# Patient Record
Sex: FEMALE | Race: WHITE | NOT HISPANIC OR LATINO | URBAN - METROPOLITAN AREA
[De-identification: names, ages, dates, MRNs, and addresses within clinical notes are randomized per-mention and may not be internally consistent; named-entity substitution may affect disease eponyms.]

---

## 2017-01-13 ENCOUNTER — OUTPATIENT (OUTPATIENT)
Dept: OUTPATIENT SERVICES | Facility: HOSPITAL | Age: 77
LOS: 1 days | Discharge: HOME | End: 2017-01-13

## 2017-03-29 ENCOUNTER — RX RENEWAL (OUTPATIENT)
Age: 77
End: 2017-03-29

## 2017-04-05 ENCOUNTER — OUTPATIENT (OUTPATIENT)
Dept: OUTPATIENT SERVICES | Facility: HOSPITAL | Age: 77
LOS: 1 days | Discharge: HOME | End: 2017-04-05

## 2017-04-06 ENCOUNTER — RX RENEWAL (OUTPATIENT)
Age: 77
End: 2017-04-06

## 2017-04-12 ENCOUNTER — RX RENEWAL (OUTPATIENT)
Age: 77
End: 2017-04-12

## 2017-04-13 ENCOUNTER — RX RENEWAL (OUTPATIENT)
Age: 77
End: 2017-04-13

## 2017-05-23 ENCOUNTER — OUTPATIENT (OUTPATIENT)
Dept: OUTPATIENT SERVICES | Facility: HOSPITAL | Age: 77
LOS: 1 days | Discharge: HOME | End: 2017-05-23

## 2017-05-23 ENCOUNTER — APPOINTMENT (OUTPATIENT)
Dept: HEMATOLOGY ONCOLOGY | Facility: CLINIC | Age: 77
End: 2017-05-23

## 2017-05-23 VITALS
WEIGHT: 166 LBS | HEART RATE: 80 BPM | HEIGHT: 61 IN | DIASTOLIC BLOOD PRESSURE: 63 MMHG | SYSTOLIC BLOOD PRESSURE: 140 MMHG | RESPIRATION RATE: 14 BRPM | BODY MASS INDEX: 31.34 KG/M2 | TEMPERATURE: 97.6 F

## 2017-05-23 DIAGNOSIS — N39.0 URINARY TRACT INFECTION, SITE NOT SPECIFIED: ICD-10-CM

## 2017-05-25 LAB
25(OH)D3 SERPL-MCNC: 77 NG/ML
ALBUMIN SERPL-MCNC: 4 G/DL
ALBUMIN/GLOB SERPL: 2
ALP SERPL-CCNC: 50 IU/L
ALT SERPL-CCNC: 19 IU/L
ANION GAP SERPL CALC-SCNC: 13 MEQ/L
AST SERPL-CCNC: 31 IU/L
BASOPHILS # BLD: 0.02 TH/MM3
BASOPHILS NFR BLD: 0.4 %
BILIRUB SERPL-MCNC: 0.8 MG/DL
BUN SERPL-MCNC: 15 MG/DL
BUN/CREAT SERPL: 10 %
CALCIUM SERPL-MCNC: 9.1 MG/DL
CHLORIDE SERPL-SCNC: 103 MEQ/L
CHOLEST SERPL-MCNC: 182 MG/DL
CO2 SERPL-SCNC: 24 MEQ/L
CREAT SERPL-MCNC: 1.5 MG/DL
EOSINOPHIL # BLD: 0.18 TH/MM3
EOSINOPHIL NFR BLD: 3.5 %
ERYTHROCYTE [DISTWIDTH] IN BLOOD BY AUTOMATED COUNT: 12.9 %
GFR SERPL CREATININE-BSD FRML MDRD: 34
GLUCOSE SERPL-MCNC: 101 MG/DL
GRANULOCYTES # BLD: 3.21 TH/MM3
GRANULOCYTES NFR BLD: 62.4 %
HCT VFR BLD AUTO: 29.7 %
HGB BLD-MCNC: 10.3 G/DL
IMM GRANULOCYTES # BLD: 0 TH/MM3
IMM GRANULOCYTES NFR BLD: 0 %
LYMPHOCYTES # BLD: 1.49 TH/MM3
LYMPHOCYTES NFR BLD: 29 %
MCH RBC QN AUTO: 35.5 PG
MCHC RBC AUTO-ENTMCNC: 34.7 G/DL
MCV RBC AUTO: 102.4 FL
MONOCYTES # BLD: 0.24 TH/MM3
MONOCYTES NFR BLD: 4.7 %
PLATELET # BLD: 173 TH/MM3
PMV BLD AUTO: 10.1 FL
POTASSIUM SERPL-SCNC: 4.5 MMOL/L
PROT SERPL-MCNC: 6 G/DL
RBC # BLD AUTO: 2.9 MIL/MM3
SODIUM SERPL-SCNC: 140 MEQ/L
VITAMIN D2 SERPL-MCNC: 18 NG/ML
VITAMIN D3 SERPL-MCNC: 59 NG/ML
WBC # BLD: 5.14 TH/MM3

## 2017-05-30 ENCOUNTER — APPOINTMENT (OUTPATIENT)
Dept: HEMATOLOGY ONCOLOGY | Facility: CLINIC | Age: 77
End: 2017-05-30

## 2017-05-30 LAB — BCR/ABL BY PCR (NORTH): NORMAL

## 2017-06-05 ENCOUNTER — INPATIENT (INPATIENT)
Facility: HOSPITAL | Age: 77
LOS: 4 days | Discharge: HOME HEALTH | End: 2017-06-10
Attending: HOSPITALIST

## 2017-06-05 DIAGNOSIS — N39.0 URINARY TRACT INFECTION, SITE NOT SPECIFIED: ICD-10-CM

## 2017-06-28 DIAGNOSIS — B96.20 UNSPECIFIED ESCHERICHIA COLI [E. COLI] AS THE CAUSE OF DISEASES CLASSIFIED ELSEWHERE: ICD-10-CM

## 2017-06-28 DIAGNOSIS — Z90.5 ACQUIRED ABSENCE OF KIDNEY: ICD-10-CM

## 2017-06-28 DIAGNOSIS — E86.0 DEHYDRATION: ICD-10-CM

## 2017-06-28 DIAGNOSIS — N30.01 ACUTE CYSTITIS WITH HEMATURIA: ICD-10-CM

## 2017-06-28 DIAGNOSIS — F03.90 UNSPECIFIED DEMENTIA, UNSPECIFIED SEVERITY, WITHOUT BEHAVIORAL DISTURBANCE, PSYCHOTIC DISTURBANCE, MOOD DISTURBANCE, AND ANXIETY: ICD-10-CM

## 2017-06-28 DIAGNOSIS — R13.19 OTHER DYSPHAGIA: ICD-10-CM

## 2017-06-28 DIAGNOSIS — N39.0 URINARY TRACT INFECTION, SITE NOT SPECIFIED: ICD-10-CM

## 2017-06-28 DIAGNOSIS — N17.0 ACUTE KIDNEY FAILURE WITH TUBULAR NECROSIS: ICD-10-CM

## 2017-06-28 DIAGNOSIS — D18.09 HEMANGIOMA OF OTHER SITES: ICD-10-CM

## 2017-06-28 DIAGNOSIS — E83.42 HYPOMAGNESEMIA: ICD-10-CM

## 2017-06-28 DIAGNOSIS — M47.9 SPONDYLOSIS, UNSPECIFIED: ICD-10-CM

## 2017-06-28 DIAGNOSIS — D69.6 THROMBOCYTOPENIA, UNSPECIFIED: ICD-10-CM

## 2017-06-28 DIAGNOSIS — D50.9 IRON DEFICIENCY ANEMIA, UNSPECIFIED: ICD-10-CM

## 2017-06-28 DIAGNOSIS — N18.3 CHRONIC KIDNEY DISEASE, STAGE 3 (MODERATE): ICD-10-CM

## 2017-06-28 DIAGNOSIS — C92.10 CHRONIC MYELOID LEUKEMIA, BCR/ABL-POSITIVE, NOT HAVING ACHIEVED REMISSION: ICD-10-CM

## 2017-06-28 DIAGNOSIS — K80.20 CALCULUS OF GALLBLADDER WITHOUT CHOLECYSTITIS WITHOUT OBSTRUCTION: ICD-10-CM

## 2017-07-10 ENCOUNTER — OUTPATIENT (OUTPATIENT)
Dept: OUTPATIENT SERVICES | Facility: HOSPITAL | Age: 77
LOS: 1 days | Discharge: HOME | End: 2017-07-10

## 2017-07-10 DIAGNOSIS — F41.1 GENERALIZED ANXIETY DISORDER: ICD-10-CM

## 2017-07-10 DIAGNOSIS — N39.0 URINARY TRACT INFECTION, SITE NOT SPECIFIED: ICD-10-CM

## 2017-09-27 ENCOUNTER — OUTPATIENT (OUTPATIENT)
Dept: OUTPATIENT SERVICES | Facility: HOSPITAL | Age: 77
LOS: 1 days | Discharge: HOME | End: 2017-09-27

## 2017-09-27 DIAGNOSIS — F41.1 GENERALIZED ANXIETY DISORDER: ICD-10-CM

## 2017-11-20 ENCOUNTER — EMERGENCY (EMERGENCY)
Facility: HOSPITAL | Age: 77
LOS: 0 days | Discharge: HOME | End: 2017-11-21
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
## 2017-11-20 DIAGNOSIS — S09.90XA UNSPECIFIED INJURY OF HEAD, INITIAL ENCOUNTER: ICD-10-CM

## 2017-11-20 DIAGNOSIS — N39.0 URINARY TRACT INFECTION, SITE NOT SPECIFIED: ICD-10-CM
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
## 2017-11-20 DIAGNOSIS — Z23 ENCOUNTER FOR IMMUNIZATION: ICD-10-CM

## 2017-11-20 DIAGNOSIS — F03.90 UNSPECIFIED DEMENTIA WITHOUT BEHAVIORAL DISTURBANCE: ICD-10-CM
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
## 2017-11-20 DIAGNOSIS — E78.5 HYPERLIPIDEMIA, UNSPECIFIED: ICD-10-CM

## 2017-12-04 ENCOUNTER — APPOINTMENT (OUTPATIENT)
Dept: HEMATOLOGY ONCOLOGY | Facility: CLINIC | Age: 77
End: 2017-12-04

## 2017-12-04 ENCOUNTER — OUTPATIENT (OUTPATIENT)
Dept: OUTPATIENT SERVICES | Facility: HOSPITAL | Age: 77
LOS: 1 days | Discharge: HOME | End: 2017-12-04

## 2017-12-04 ENCOUNTER — RESULT REVIEW (OUTPATIENT)
Age: 77
End: 2017-12-04

## 2017-12-07 DIAGNOSIS — C92.11 CHRONIC MYELOID LEUKEMIA, BCR/ABL-POSITIVE, IN REMISSION: ICD-10-CM

## 2017-12-07 LAB
ALBUMIN SERPL-MCNC: 4 G/DL
ALBUMIN/GLOB SERPL: 1.9
ALP SERPL-CCNC: 65 IU/L
ALT SERPL-CCNC: 21 IU/L
ANION GAP SERPL CALC-SCNC: 10 MEQ/L
AST SERPL-CCNC: 36 IU/L
BASOPHILS # BLD: 0.01 TH/MM3
BASOPHILS NFR BLD: 0.2 %
BILIRUB SERPL-MCNC: 0.7 MG/DL
BUN SERPL-MCNC: 12 MG/DL
BUN/CREAT SERPL: 9 %
CALCIUM SERPL-MCNC: 9.3 MG/DL
CHLORIDE SERPL-SCNC: 103 MEQ/L
CO2 SERPL-SCNC: 23 MEQ/L
CREAT SERPL-MCNC: 1.33 MG/DL
EOSINOPHIL # BLD: 0.21 TH/MM3
EOSINOPHIL NFR BLD: 4.7 %
ERYTHROCYTE [DISTWIDTH] IN BLOOD BY AUTOMATED COUNT: 13.3 %
GFR SERPL CREATININE-BSD FRML MDRD: 39
GLUCOSE SERPL-MCNC: 89 MG/DL
GRANULOCYTES # BLD: 2.43 TH/MM3
GRANULOCYTES NFR BLD: 54.2 %
HCT VFR BLD AUTO: 27.4 %
HGB BLD-MCNC: 9 G/DL
IMM GRANULOCYTES # BLD: 0.01 TH/MM3
IMM GRANULOCYTES NFR BLD: 0.2 %
LYMPHOCYTES # BLD: 1.58 TH/MM3
LYMPHOCYTES NFR BLD: 35.3 %
MCH RBC QN AUTO: 34.6 PG
MCHC RBC AUTO-ENTMCNC: 32.8 G/DL
MCV RBC AUTO: 105.4 FL
MONOCYTES # BLD: 0.24 TH/MM3
MONOCYTES NFR BLD: 5.4 %
PERCENT SATURATION (NORTH): 31.4 %
PLATELET # BLD: 245 TH/MM3
PMV BLD AUTO: 9 FL
POTASSIUM SERPL-SCNC: 4.6 MMOL/L
PROT SERPL-MCNC: 6.1 G/DL
RBC # BLD AUTO: 2.6 MIL/MM3
SODIUM SERPL-SCNC: 136 MEQ/L
TIBC SERPL-MCNC: 255 UG/DL
TRANSFERRIN SERPL-MCNC: 182 MG/DL
WBC # BLD: 4.48 TH/MM3

## 2017-12-12 LAB
BCR/ABL BY PCR (NORTH): NORMAL
FERRITIN SERPL-MCNC: 464 NG/ML
FOLATE SERPL-MCNC: > 20 NG/ML
VIT B12 SERPL-MCNC: 927 PG/ML

## 2018-01-12 ENCOUNTER — OUTPATIENT (OUTPATIENT)
Dept: OUTPATIENT SERVICES | Facility: HOSPITAL | Age: 78
LOS: 1 days | Discharge: HOME | End: 2018-01-12

## 2018-01-12 DIAGNOSIS — F41.1 GENERALIZED ANXIETY DISORDER: ICD-10-CM

## 2018-03-27 ENCOUNTER — OUTPATIENT (OUTPATIENT)
Dept: OUTPATIENT SERVICES | Facility: HOSPITAL | Age: 78
LOS: 1 days | Discharge: HOME | End: 2018-03-27

## 2018-03-27 DIAGNOSIS — M25.552 PAIN IN LEFT HIP: ICD-10-CM

## 2018-05-02 ENCOUNTER — OUTPATIENT (OUTPATIENT)
Dept: OUTPATIENT SERVICES | Facility: HOSPITAL | Age: 78
LOS: 1 days | Discharge: HOME | End: 2018-05-02

## 2018-05-02 DIAGNOSIS — F33.0 MAJOR DEPRESSIVE DISORDER, RECURRENT, MILD: ICD-10-CM

## 2018-05-02 DIAGNOSIS — F41.1 GENERALIZED ANXIETY DISORDER: ICD-10-CM

## 2018-06-12 ENCOUNTER — APPOINTMENT (OUTPATIENT)
Dept: HEMATOLOGY ONCOLOGY | Facility: CLINIC | Age: 78
End: 2018-06-12

## 2018-06-12 ENCOUNTER — LABORATORY RESULT (OUTPATIENT)
Age: 78
End: 2018-06-12

## 2018-06-12 ENCOUNTER — OUTPATIENT (OUTPATIENT)
Dept: OUTPATIENT SERVICES | Facility: HOSPITAL | Age: 78
LOS: 1 days | Discharge: HOME | End: 2018-06-12
Payer: SELF-PAY

## 2018-06-12 VITALS
WEIGHT: 166 LBS | TEMPERATURE: 98.6 F | RESPIRATION RATE: 14 BRPM | HEART RATE: 75 BPM | HEIGHT: 61 IN | BODY MASS INDEX: 31.34 KG/M2

## 2018-06-12 DIAGNOSIS — C92.10 CHRONIC MYELOID LEUKEMIA, BCR/ABL-POSITIVE, NOT HAVING ACHIEVED REMISSION: ICD-10-CM

## 2018-06-12 RX ORDER — IMATINIB MESYLATE 400 MG/1
400 TABLET ORAL DAILY
Qty: 90 | Refills: 2 | Status: ACTIVE | COMMUNITY
Start: 2017-04-12 | End: 1900-01-01

## 2018-06-12 NOTE — REASON FOR VISIT
[Follow-Up Visit] : a follow-up visit for [Myeloproliferative Disorder] : myeloproliferative disorder

## 2018-06-14 LAB
APPEARANCE: CLEAR
BILIRUBIN URINE: NEGATIVE
BLOOD URINE: NEGATIVE
COLOR: YELLOW
GLUCOSE QUALITATIVE U: NEGATIVE MG/DL
KETONES URINE: NEGATIVE
LEUKOCYTE ESTERASE URINE: ABNORMAL
NITRITE URINE: NEGATIVE
PH URINE: 6.5
PROTEIN URINE: NEGATIVE MG/DL
SPECIFIC GRAVITY URINE: 1.01
UROBILINOGEN URINE: 0.2 MG/DL (ref 0.2–?)

## 2018-06-14 NOTE — ASSESSMENT
[FreeTextEntry1] : This is a 76-year-old female with end-stage dementia as well as CML chronic phase since 2010 has been on Gleevec 400 mg daily with most BCR ABL PCR demonstrated molecular response. She also has mild anemia possibly related to Gleevec.\par It was understood that patient dementia is getting worse and getting her to doctors appointments has been difficult\par we talked about stopping gleevac if BCR ABL PCR is undetectable and observing her. \par \par Plan:\par -Will repeat  BCR ABL PCR and will call daughter with results. If undetectable will may consider to stop gleevac and observe her with blood work granted it would need to be monthly for not but given her dementia maybe every 3 months would be sufficient .\par -Her HB is stable at 9.9\par -rtc 6 months if all stable

## 2018-06-14 NOTE — HISTORY OF PRESENT ILLNESS
[de-identified] :  The patient with history of CML, currently on Gleevec.\par \par HISTORY OF PRESENT ILLNESS:  Ms. Mckenzie is a pleasant 76-year-old female patient with a history of CML diagnosed in 2010, currently on Gleevec at 400 mg daily.  Her last evaluation was in 11/2015.  She had missed her followup appointment in 02/2016.  The patient is currently on Gleevec at 400 mg a day.  [facility] has advanced [facility]  Currently has a helper at home and lives with her daughter.  The last  CBC  white count of 5.34 with a hemoglobin of 10.2 and a platelet count of 175.  Her CBC is stable.  Her hemoglobin in 11/2015 was at 10.8.  Her BCRABL testing in 11/2015 showed no detection of the BCRABL.  She also was noted to have a normal creatinine in 11/2015 of 1.05, a GFR of 51.  No evidence of transaminitis. [de-identified] : She presents for followup appointment  her BCR/ABL PCR on On June 20, 2016 was not detectable  the rest of blood work was as above. Here with her daughter Allison. She is alert and oriented x0. Her daughter states us initially she does remember. She has 24-hour care at home. She is tolerating Gleevec without any consultations that her daughter can comment on. The patient is not complaining of anything but it's difficult to communicate with her because of the underlying dementia.\par \par 5/23/17\par She errturns for follow up with her daughter. Dementia is about the same. No complaints\par \par 12/4/17\par Patient returns for follow up, her dementia is at baseline, she complains of a headache, otherwise feels well. The family is concerned about anemia as the patient has become pale and less energetic. The patient denies any shortness of breath, chest pain, nausea/vomiting or abdominal pain. As per her daughter no blood in her stool or dark stool. \par \par 06/2018\par Patient is here with her daughter for a follow up visit. Her dementia is worse and as per daughter, she has trouble getting her out of the house. She hadblood work done at [facility] and it showed HB 9.9 ( Improved from 9.0) MCV of  105. She continues on Gleevac and has been tolerating OK. \par Her LFTs were Normal.  \par

## 2018-06-14 NOTE — PHYSICAL EXAM
[Restricted in physically strenuous activity but ambulatory and able to carry out work of a light or sedentary nature] : Status 1- Restricted in physically strenuous activity but ambulatory and able to carry out work of a light or sedentary nature, e.g., light house work, office work [Normal] : grossly intact [de-identified] : completely disoriented

## 2018-06-14 NOTE — REVIEW OF SYSTEMS
[Negative] : Heme/Lymph [Fever] : no fever [Recent Change In Weight] : ~T no recent weight change [FreeTextEntry2] : once again difficult to obtain from patient and were obtained from the daughter

## 2018-06-15 LAB
BACTERIA UR CULT: ABNORMAL
BCR/ABL BY RT - PCR QUANTITATIVE: SIGNIFICANT CHANGE UP

## 2018-06-18 DIAGNOSIS — C92.11 CHRONIC MYELOID LEUKEMIA, BCR/ABL-POSITIVE, IN REMISSION: ICD-10-CM

## 2018-06-21 ENCOUNTER — INPATIENT (INPATIENT)
Facility: HOSPITAL | Age: 78
LOS: 10 days | End: 2018-07-02
Attending: SURGERY | Admitting: SURGERY
Payer: MEDICARE

## 2018-06-21 VITALS
DIASTOLIC BLOOD PRESSURE: 114 MMHG | HEART RATE: 89 BPM | SYSTOLIC BLOOD PRESSURE: 172 MMHG | OXYGEN SATURATION: 98 % | RESPIRATION RATE: 18 BRPM

## 2018-06-21 DIAGNOSIS — S02.602A FRACTURE OF UNSPECIFIED PART OF BODY OF LEFT MANDIBLE, INITIAL ENCOUNTER FOR CLOSED FRACTURE: ICD-10-CM

## 2018-06-21 LAB
ALBUMIN SERPL ELPH-MCNC: 4 G/DL — SIGNIFICANT CHANGE UP (ref 3.5–5.2)
ALP SERPL-CCNC: 67 U/L — SIGNIFICANT CHANGE UP (ref 30–115)
ALT FLD-CCNC: 32 U/L — SIGNIFICANT CHANGE UP (ref 0–41)
ANION GAP SERPL CALC-SCNC: 12 MMOL/L — SIGNIFICANT CHANGE UP (ref 7–14)
ANION GAP SERPL CALC-SCNC: 17 MMOL/L — HIGH (ref 7–14)
APPEARANCE UR: (no result)
APTT BLD: 28.1 SEC — SIGNIFICANT CHANGE UP (ref 27–39.2)
AST SERPL-CCNC: 57 U/L — HIGH (ref 0–41)
BASOPHILS # BLD AUTO: 0.02 K/UL — SIGNIFICANT CHANGE UP (ref 0–0.2)
BASOPHILS NFR BLD AUTO: 0.2 % — SIGNIFICANT CHANGE UP (ref 0–1)
BILIRUB SERPL-MCNC: 0.5 MG/DL — SIGNIFICANT CHANGE UP (ref 0.2–1.2)
BILIRUB UR-MCNC: NEGATIVE — SIGNIFICANT CHANGE UP
BLD GP AB SCN SERPL QL: SIGNIFICANT CHANGE UP
BUN SERPL-MCNC: 22 MG/DL — HIGH (ref 10–20)
BUN SERPL-MCNC: 24 MG/DL — HIGH (ref 10–20)
CA-I SERPL-SCNC: 1.2 MMOL/L — SIGNIFICANT CHANGE UP (ref 1.12–1.3)
CALCIUM SERPL-MCNC: 8.5 MG/DL — SIGNIFICANT CHANGE UP (ref 8.5–10.1)
CALCIUM SERPL-MCNC: 8.8 MG/DL — SIGNIFICANT CHANGE UP (ref 8.5–10.1)
CHLORIDE SERPL-SCNC: 103 MMOL/L — SIGNIFICANT CHANGE UP (ref 98–110)
CHLORIDE SERPL-SCNC: 99 MMOL/L — SIGNIFICANT CHANGE UP (ref 98–110)
CO2 SERPL-SCNC: 21 MMOL/L — SIGNIFICANT CHANGE UP (ref 17–32)
CO2 SERPL-SCNC: 24 MMOL/L — SIGNIFICANT CHANGE UP (ref 17–32)
COLOR SPEC: SIGNIFICANT CHANGE UP
CREAT SERPL-MCNC: 1.4 MG/DL — SIGNIFICANT CHANGE UP (ref 0.7–1.5)
CREAT SERPL-MCNC: 1.6 MG/DL — HIGH (ref 0.7–1.5)
DIFF PNL FLD: (no result)
EOSINOPHIL # BLD AUTO: 0.02 K/UL — SIGNIFICANT CHANGE UP (ref 0–0.7)
EOSINOPHIL NFR BLD AUTO: 0.2 % — SIGNIFICANT CHANGE UP (ref 0–8)
GAS PNL BLDV: 139 MMOL/L — SIGNIFICANT CHANGE UP (ref 136–145)
GAS PNL BLDV: SIGNIFICANT CHANGE UP
GLUCOSE SERPL-MCNC: 134 MG/DL — HIGH (ref 70–99)
GLUCOSE SERPL-MCNC: 136 MG/DL — HIGH (ref 70–99)
GLUCOSE UR QL: NEGATIVE — SIGNIFICANT CHANGE UP
HCO3 BLDV-SCNC: 23 MMOL/L — SIGNIFICANT CHANGE UP (ref 22–29)
HCT VFR BLD CALC: 22.8 % — LOW (ref 37–47)
HCT VFR BLD CALC: 25.8 % — LOW (ref 37–47)
HCT VFR BLD CALC: 26 % — LOW (ref 37–47)
HCT VFR BLDA CALC: 22.4 % — LOW (ref 34–44)
HGB BLD CALC-MCNC: 7.3 G/DL — LOW (ref 14–18)
HGB BLD-MCNC: 7.7 G/DL — LOW (ref 12–16)
HGB BLD-MCNC: 8.9 G/DL — LOW (ref 12–16)
HGB BLD-MCNC: 9.2 G/DL — LOW (ref 12–16)
IMM GRANULOCYTES NFR BLD AUTO: 0.7 % — HIGH (ref 0.1–0.3)
INR BLD: 1.14 RATIO — SIGNIFICANT CHANGE UP (ref 0.65–1.3)
KETONES UR-MCNC: NEGATIVE — SIGNIFICANT CHANGE UP
LACTATE BLDV-MCNC: 2.5 MMOL/L — HIGH (ref 0.5–1.6)
LACTATE SERPL-SCNC: 2.7 MMOL/L — HIGH (ref 0.5–2.2)
LEUKOCYTE ESTERASE UR-ACNC: NEGATIVE — SIGNIFICANT CHANGE UP
LIDOCAIN IGE QN: 60 U/L — SIGNIFICANT CHANGE UP (ref 7–60)
LYMPHOCYTES # BLD AUTO: 1.06 K/UL — LOW (ref 1.2–3.4)
LYMPHOCYTES # BLD AUTO: 8.5 % — LOW (ref 20.5–51.1)
MAGNESIUM SERPL-MCNC: 1.7 MG/DL — LOW (ref 1.8–2.4)
MCHC RBC-ENTMCNC: 33.6 PG — HIGH (ref 27–31)
MCHC RBC-ENTMCNC: 33.8 G/DL — SIGNIFICANT CHANGE UP (ref 32–37)
MCHC RBC-ENTMCNC: 34.1 PG — HIGH (ref 27–31)
MCHC RBC-ENTMCNC: 34.4 PG — HIGH (ref 27–31)
MCHC RBC-ENTMCNC: 34.5 G/DL — SIGNIFICANT CHANGE UP (ref 32–37)
MCHC RBC-ENTMCNC: 35.4 G/DL — SIGNIFICANT CHANGE UP (ref 32–37)
MCV RBC AUTO: 100.9 FL — HIGH (ref 81–99)
MCV RBC AUTO: 94.9 FL — SIGNIFICANT CHANGE UP (ref 81–99)
MCV RBC AUTO: 99.6 FL — HIGH (ref 81–99)
MONOCYTES # BLD AUTO: 0.42 K/UL — SIGNIFICANT CHANGE UP (ref 0.1–0.6)
MONOCYTES NFR BLD AUTO: 3.4 % — SIGNIFICANT CHANGE UP (ref 1.7–9.3)
NEUTROPHILS # BLD AUTO: 10.9 K/UL — HIGH (ref 1.4–6.5)
NEUTROPHILS NFR BLD AUTO: 87 % — HIGH (ref 42.2–75.2)
NITRITE UR-MCNC: NEGATIVE — SIGNIFICANT CHANGE UP
NRBC # BLD: 0 /100 WBCS — SIGNIFICANT CHANGE UP (ref 0–0)
PCO2 BLDV: 32 MMHG — LOW (ref 41–51)
PH BLDV: 7.48 — HIGH (ref 7.26–7.43)
PH UR: 6.5 — SIGNIFICANT CHANGE UP (ref 5–8)
PHOSPHATE SERPL-MCNC: 3.9 MG/DL — SIGNIFICANT CHANGE UP (ref 2.1–4.9)
PLATELET # BLD AUTO: 130 K/UL — SIGNIFICANT CHANGE UP (ref 130–400)
PLATELET # BLD AUTO: 135 K/UL — SIGNIFICANT CHANGE UP (ref 130–400)
PLATELET # BLD AUTO: 167 K/UL — SIGNIFICANT CHANGE UP (ref 130–400)
PO2 BLDV: 19 MMHG — LOW (ref 20–40)
POTASSIUM BLDV-SCNC: 4 MMOL/L — SIGNIFICANT CHANGE UP (ref 3.3–5.6)
POTASSIUM SERPL-MCNC: 4.2 MMOL/L — SIGNIFICANT CHANGE UP (ref 3.5–5)
POTASSIUM SERPL-MCNC: 4.4 MMOL/L — SIGNIFICANT CHANGE UP (ref 3.5–5)
POTASSIUM SERPL-SCNC: 4.2 MMOL/L — SIGNIFICANT CHANGE UP (ref 3.5–5)
POTASSIUM SERPL-SCNC: 4.4 MMOL/L — SIGNIFICANT CHANGE UP (ref 3.5–5)
PROT SERPL-MCNC: 6 G/DL — SIGNIFICANT CHANGE UP (ref 6–8)
PROT UR-MCNC: 100
PROTHROM AB SERPL-ACNC: 12.3 SEC — SIGNIFICANT CHANGE UP (ref 9.95–12.87)
RBC # BLD: 2.26 M/UL — LOW (ref 4.2–5.4)
RBC # BLD: 2.59 M/UL — LOW (ref 4.2–5.4)
RBC # BLD: 2.74 M/UL — LOW (ref 4.2–5.4)
RBC # FLD: 12.7 % — SIGNIFICANT CHANGE UP (ref 11.5–14.5)
RBC # FLD: 12.8 % — SIGNIFICANT CHANGE UP (ref 11.5–14.5)
RBC # FLD: 16.2 % — HIGH (ref 11.5–14.5)
RBC CASTS # UR COMP ASSIST: >50 /HPF
SAO2 % BLDV: 35 % — SIGNIFICANT CHANGE UP
SODIUM SERPL-SCNC: 137 MMOL/L — SIGNIFICANT CHANGE UP (ref 135–146)
SODIUM SERPL-SCNC: 139 MMOL/L — SIGNIFICANT CHANGE UP (ref 135–146)
SP GR SPEC: >=1.03 — SIGNIFICANT CHANGE UP (ref 1.01–1.03)
TROPONIN T SERPL-MCNC: <0.01 NG/ML — SIGNIFICANT CHANGE UP
TYPE + AB SCN PNL BLD: SIGNIFICANT CHANGE UP
UROBILINOGEN FLD QL: 1 (ref 0.2–0.2)
WBC # BLD: 11.85 K/UL — HIGH (ref 4.8–10.8)
WBC # BLD: 12.51 K/UL — HIGH (ref 4.8–10.8)
WBC # BLD: 9.13 K/UL — SIGNIFICANT CHANGE UP (ref 4.8–10.8)
WBC # FLD AUTO: 11.85 K/UL — HIGH (ref 4.8–10.8)
WBC # FLD AUTO: 12.51 K/UL — HIGH (ref 4.8–10.8)
WBC # FLD AUTO: 9.13 K/UL — SIGNIFICANT CHANGE UP (ref 4.8–10.8)

## 2018-06-21 RX ORDER — SODIUM CHLORIDE 9 MG/ML
1000 INJECTION, SOLUTION INTRAVENOUS ONCE
Qty: 0 | Refills: 0 | Status: COMPLETED | OUTPATIENT
Start: 2018-06-21 | End: 2018-06-21

## 2018-06-21 RX ORDER — MEMANTINE HYDROCHLORIDE 10 MG/1
0 TABLET ORAL
Qty: 180 | Refills: 0 | COMMUNITY

## 2018-06-21 RX ORDER — ESOMEPRAZOLE MAGNESIUM 40 MG/1
0 CAPSULE, DELAYED RELEASE ORAL
Qty: 30 | Refills: 0 | COMMUNITY

## 2018-06-21 RX ORDER — LEVETIRACETAM 250 MG/1
500 TABLET, FILM COATED ORAL ONCE
Qty: 0 | Refills: 0 | Status: COMPLETED | OUTPATIENT
Start: 2018-06-21 | End: 2018-06-21

## 2018-06-21 RX ORDER — GABAPENTIN 400 MG/1
0 CAPSULE ORAL
Qty: 270 | Refills: 0 | COMMUNITY

## 2018-06-21 RX ORDER — ESCITALOPRAM OXALATE 10 MG/1
0 TABLET, FILM COATED ORAL
Qty: 270 | Refills: 0 | COMMUNITY

## 2018-06-21 RX ORDER — MORPHINE SULFATE 50 MG/1
2 CAPSULE, EXTENDED RELEASE ORAL ONCE
Qty: 0 | Refills: 0 | Status: DISCONTINUED | OUTPATIENT
Start: 2018-06-21 | End: 2018-06-21

## 2018-06-21 RX ORDER — PANTOPRAZOLE SODIUM 20 MG/1
40 TABLET, DELAYED RELEASE ORAL DAILY
Qty: 0 | Refills: 0 | Status: DISCONTINUED | OUTPATIENT
Start: 2018-06-21 | End: 2018-06-26

## 2018-06-21 RX ORDER — MORPHINE SULFATE 50 MG/1
1 CAPSULE, EXTENDED RELEASE ORAL ONCE
Qty: 0 | Refills: 0 | Status: DISCONTINUED | OUTPATIENT
Start: 2018-06-21 | End: 2018-06-21

## 2018-06-21 RX ORDER — MORPHINE SULFATE 50 MG/1
1 CAPSULE, EXTENDED RELEASE ORAL EVERY 4 HOURS
Qty: 0 | Refills: 0 | Status: DISCONTINUED | OUTPATIENT
Start: 2018-06-21 | End: 2018-06-22

## 2018-06-21 RX ORDER — POTASSIUM CHLORIDE 20 MEQ
0 PACKET (EA) ORAL
Qty: 90 | Refills: 0 | COMMUNITY

## 2018-06-21 RX ORDER — SODIUM CHLORIDE 9 MG/ML
1000 INJECTION, SOLUTION INTRAVENOUS ONCE
Qty: 0 | Refills: 0 | Status: DISCONTINUED | OUTPATIENT
Start: 2018-06-21 | End: 2018-06-21

## 2018-06-21 RX ORDER — OXYBUTYNIN CHLORIDE 5 MG
0 TABLET ORAL
Qty: 90 | Refills: 0 | COMMUNITY

## 2018-06-21 RX ORDER — SODIUM CHLORIDE 9 MG/ML
1000 INJECTION INTRAMUSCULAR; INTRAVENOUS; SUBCUTANEOUS
Qty: 0 | Refills: 0 | Status: DISCONTINUED | OUTPATIENT
Start: 2018-06-21 | End: 2018-06-22

## 2018-06-21 RX ORDER — MAGNESIUM SULFATE 500 MG/ML
2 VIAL (ML) INJECTION ONCE
Qty: 0 | Refills: 0 | Status: COMPLETED | OUTPATIENT
Start: 2018-06-21 | End: 2018-06-21

## 2018-06-21 RX ORDER — IMATINIB MESYLATE 400 MG/1
0 TABLET, FILM COATED ORAL
Qty: 90 | Refills: 0 | COMMUNITY

## 2018-06-21 RX ADMIN — MORPHINE SULFATE 2 MILLIGRAM(S): 50 CAPSULE, EXTENDED RELEASE ORAL at 06:40

## 2018-06-21 RX ADMIN — PANTOPRAZOLE SODIUM 40 MILLIGRAM(S): 20 TABLET, DELAYED RELEASE ORAL at 18:01

## 2018-06-21 RX ADMIN — MORPHINE SULFATE 1 MILLIGRAM(S): 50 CAPSULE, EXTENDED RELEASE ORAL at 15:58

## 2018-06-21 RX ADMIN — LEVETIRACETAM 420 MILLIGRAM(S): 250 TABLET, FILM COATED ORAL at 10:59

## 2018-06-21 RX ADMIN — MORPHINE SULFATE 1 MILLIGRAM(S): 50 CAPSULE, EXTENDED RELEASE ORAL at 22:20

## 2018-06-21 RX ADMIN — MORPHINE SULFATE 1 MILLIGRAM(S): 50 CAPSULE, EXTENDED RELEASE ORAL at 22:58

## 2018-06-21 RX ADMIN — SODIUM CHLORIDE 75 MILLILITER(S): 9 INJECTION INTRAMUSCULAR; INTRAVENOUS; SUBCUTANEOUS at 14:30

## 2018-06-21 RX ADMIN — SODIUM CHLORIDE 2000 MILLILITER(S): 9 INJECTION, SOLUTION INTRAVENOUS at 09:30

## 2018-06-21 RX ADMIN — MORPHINE SULFATE 1 MILLIGRAM(S): 50 CAPSULE, EXTENDED RELEASE ORAL at 17:00

## 2018-06-21 NOTE — ED PROVIDER NOTE - MUSCULOSKELETAL, MLM
Spine appears normal, there is a contusion to the left hand, there is tenderness to the left shoulder and full range of motion. Pulses 2+ radial bilaterally

## 2018-06-21 NOTE — CONSULT NOTE ADULT - CONSULT REASON
s/p fall, unknown LOC, unknown onset s/p SAH, un-witness fall with unknown LOC, unknown onset. Multiple fractures

## 2018-06-21 NOTE — CONSULT NOTE ADULT - SUBJECTIVE AND OBJECTIVE BOX
Patient is a 78y old  Female who presents with a chief complaint of     HPI:  77y/o F with PMHx of Dementia stage 4, presents for evaluation of possible fall, unknown onset and unknown LOC. The patient lives at home with daughter and home aide, last night she went to bed and was later heard crying for help next to her bed. The patient was taken to ED for evaluation. signs of trauma, left face hematoma, lt shoulder pain, lt wrist pain. lt thigh hematoma (21 Jun 2018 12:38)      PAST MEDICAL & SURGICAL HISTORY:  Dementia without behavioral disturbance, unspecified dementia type  No significant past surgical history    (   ) heart valve replacement  (   ) joint replacement  (   ) pregnancy    MEDICATIONS  (STANDING):  pantoprazole  Injectable 40 milliGRAM(s) IV Push daily  sodium chloride 0.9%. 1000 milliLiter(s) (75 mL/Hr) IV Continuous <Continuous>    MEDICATIONS  (PRN):  morphine  - Injectable 1 milliGRAM(s) IV Push every 4 hours PRN Moderate Pain (4 - 6)      REVIEW OF SYSTEMS      General:	    Skin/Breast:  	  Ophthalmologic:  	  ENMT:	    Respiratory and Thorax:  	  Cardiovascular:	    Gastrointestinal:	    Genitourinary:	    Musculoskeletal:	    Neurological:	    Psychiatric:	    Hematology/Lymphatics:	    Endocrine:	    Allergic/Immunologic:	    Allergies    No Known Allergies    Intolerances        FAMILY HISTORY:  No pertinent family history in first degree relatives      *SOCIAL HISTORY: (   ) Tobacco; (   ) ETOH    Vital Signs Last 24 Hrs  T(C): 36.2 (21 Jun 2018 20:00), Max: 37.4 (21 Jun 2018 09:59)  T(F): 97.1 (21 Jun 2018 20:00), Max: 99.3 (21 Jun 2018 09:59)  HR: 94 (21 Jun 2018 23:00) (81 - 97)  BP: 155/62 (21 Jun 2018 23:00) (97/55 - 187/87)  BP(mean): 94 (21 Jun 2018 23:00) (68 - 128)  RR: 15 (21 Jun 2018 23:00) (11 - 21)  SpO2: 96% (21 Jun 2018 23:00) (92% - 99%)    LABS:                        9.2    9.13  )-----------( 130      ( 21 Jun 2018 21:55 )             26.0     06-21    139  |  103  |  24<H>  ----------------------------<  134<H>  4.4   |  24  |  1.4    Ca    8.5      21 Jun 2018 21:55  Phos  3.9     06-21  Mg     1.7     06-21    TPro  6.0  /  Alb  4.0  /  TBili  0.5  /  DBili  x   /  AST  57<H>  /  ALT  32  /  AlkPhos  67  06-21    WBC Count: 9.13 K/uL [4.80 - 10.80] (06-21 @ 21:55)  Platelet Count - Automated: 130 K/uL [130 - 400] (06-21 @ 21:55)  WBC Count: 11.85 K/uL <H> [4.80 - 10.80] (06-21 @ 09:26)  Platelet Count - Automated: 135 K/uL [130 - 400] (06-21 @ 09:26)  WBC Count: 12.51 K/uL <H> [4.80 - 10.80] (06-21 @ 06:23)  Platelet Count - Automated: 167 K/uL [130 - 400] (06-21 @ 06:23)  INR: 1.14 ratio [0.65 - 1.30] (06-21 @ 06:23)    Urinalysis Basic - ( 21 Jun 2018 06:23 )    Color: Dark Yellow / Appearance: Cloudy / SG: >=1.030 / pH: x  Gluc: x / Ketone: Negative  / Bili: Negative / Urobili: 1.0   Blood: x / Protein: 100 / Nitrite: Negative   Leuk Esterase: Negative / RBC: >50 /HPF / WBC x   Sq Epi: x / Non Sq Epi: x / Bacteria: x      PT/INR - ( 21 Jun 2018 06:23 )   PT: 12.30 sec;   INR: 1.14 ratio         PTT - ( 21 Jun 2018 06:23 )  PTT:28.1 sec    Last Dental Visit: <<  >>    EOE:   Limited exam shows hematoma on left face extending to the back of left ear.              TMJ (   ) clicks                     (   ) pops                     (   ) crepitus             Mandible <<FROM>>             Facial bones and MOM <<grossly intact>>             (   ) trismus             (   ) lymphadenopathy             ( x  ) swelling             (   ) asymmetry             (   ) palpation             (   ) dyspnea             (   ) dysphagia             (   ) loss of consciousness    IOE:     Limited exam shows multiple carious, root tips on maxilla; remaining mandibular teeth are not mobile. Hemostasis achieved. No bleeding.              <<permanent/primary/mixed>> dentition:            <<grossly intact>> OR             <<multiple carious teeth>> OR             <<multiple missing teeth>>             Dentition Present <<  >>                     Mobility <<  >>                     Caries <<  >>                hard/soft palate:  (   ) palatal torus, <<No pathology noted>>            tongue/FOM <<No pathology noted>>            labial/buccal mucosa <<No pathology noted>>           (   ) percussion           (   ) palpation           (   ) swelling            (   ) abscess           (   ) sinus tract    *DENTAL RADIOGRAPHS:    RADIOLOGY & ADDITIONAL STUDIES: CT scan report states 1. acute nondisplaced fractures of the mandibular bodies bilaterally. 2. Extensive overlying soft tissue swelling and hematoma which extends deep to the inner margin of the mandible, sublingual space, oropharynx and tongue bases; as well as the  space. 3. Enlargement and heterogeneity of the left submandibular gland may reflect a hematoma, recommend clinical follow-up with follow-up imaging as appropriate. 4. C2 fracture with extensive prevertebral edema/hematoma.    *ASSESSMENT: Limited exams done due to patient being under sedation and unresponsive. Discussed with the oral surgery resident, Miguel Fernandez. Spoke to  and recommended Unasyn, pain control, and full liquid diet. Oral surgery resident will re-evaluate the patient tomorrow and discuss options regarding any possible surgical interventions with patient's family.    *PLAN: recommend Unasyn, pain control, and full liquid diet. Oral surgery resident will re-evaluate the patient tomorrow and discuss options regarding any possible surgical interventions with patient's family.    PROCEDURE:   Verbal and written consent given.  Anesthesia: <<    >>   Treatment: <<    >>     RECOMMENDATIONS:  1) <<  recommend Unasyn, pain control, and full liquid diet. Oral surgery resident will re-evaluate the patient tomorrow and discuss options regarding any possible surgical interventions with patient's family. >>  2) Dental F/U with outpatient dentist for comprehensive dental care.   3) If any difficulty swallowing/breathing, fever occur, return to ER.     Resident Name, pager # Deidra Bakari, [phone]

## 2018-06-21 NOTE — ED PROVIDER NOTE - PROGRESS NOTE DETAILS
Trauma consult placed Trauma at bedside Patient signed out to Dr. Quijano CT read+ subarachnoid, + c2 fracture. Neurosurgery called. Pt evaluated at bedside by Dr Myles earlier, pt with SAH, rib fx, C2 fx, seen by NS rec Keppra, pt h/h dec, became transiently hypotensive responsive to fluids, crossmatched for blood and trauma made aware of these findings, will reassess pt now, likely ICU admission. extremity fx on xr, ortho paged ortho made aware of pt

## 2018-06-21 NOTE — CONSULT NOTE ADULT - SUBJECTIVE AND OBJECTIVE BOX
HISTORY OF PRESENT ILLNESS:   · HPI Objective Statement: 77 yo F with hx of dementia stage 4, presents for evaluation of possible fall, unknown onset, unknown LOC, per home aide, patient was found sitting and crying for help next to her bed. NO fever, no chills, no n/v/d. Per home aide, patient went to sleep last night and then she was crying of pain. Patient is not on a blood thinner. No other symptoms reported	    PAST MEDICAL/SURGICAL/FAMILY/SOCIAL HISTORY:     Dementia without behavioral disturbance, unspecified dementia type      Allergies    No Known Allergies        MEDICATIONS:  Antibiotics:          Vital Signs Last 24 Hrs  T(C): 37.1 (21 Jun 2018 06:00), Max: 37.1 (21 Jun 2018 06:00)  T(F): 98.8 (21 Jun 2018 06:00), Max: 98.8 (21 Jun 2018 06:00)  HR: 97 (21 Jun 2018 08:00) (86 - 97)  BP: 122/51 (21 Jun 2018 08:00) (108/64 - 172/114)  BP(mean): --  RR: 18 (21 Jun 2018 08:00) (18 - 19)  SpO2: 95% (21 Jun 2018 08:00) (95% - 98%)    PHYSICAL EXAM:  alert, confused ( baseline dementia )   ISAC [id number]   MS equal throughtout   neck collar  in place :          LABS:                        7.7    11.85 )-----------( 135      ( 21 Jun 2018 09:26 )             22.8     06-21    137  |  99  |  22<H>  ----------------------------<  136<H>  4.2   |  21  |  1.6<H>    Ca    8.8      21 Jun 2018 06:23    TPro  6.0  /  Alb  4.0  /  TBili  0.5  /  DBili  x   /  AST  57<H>  /  ALT  32  /  AlkPhos  67  06-21    PT/INR - ( 21 Jun 2018 06:23 )   PT: 12.30 sec;   INR: 1.14 ratio         PTT - ( 21 Jun 2018 06:23 )  PTT:28.1 sec        RADIOLOGY & ADDITIONAL STUDIES:    < from: CT Head No Cont (06.21.18 @ 08:36) >  IMPRESSION:     1.  Small scattered left-sided subarachnoid hemorrhage. No significant   mass effect.    2.  Stable moderate chronic microvascular changes and cerebral atrophy.    < end of copied text >  .  < from: CT Maxillofacial No Cont (06.21.18 @ 08:35) >    1.  Acute nondisplaced fractures of the mandibular bodies bilaterally.    2.  Extensive overlying soft tissue swelling and hematoma which extends   deep to the inner margin of the mandible, sublingual space, oropharynx   and tongue base; as well as the  space.    3.  Enlargement and heterogeneity of the left submandibular gland may   reflect a hematoma, recommend clinical follow-up with follow-up imaging   as appropriate.    4.  C2 fracture with extensive prevertebral edema/hematoma, see   accompanying cervical spine CT.    < end of copied text >    A/P          S/p fall SAH / C2 fx               keppra 500 mg q 12                Rpt HCt                neuro checks                miami j collar

## 2018-06-21 NOTE — ED ADULT NURSE REASSESSMENT NOTE - NS ED NURSE REASSESS COMMENT FT1
pt is a 79 y/o female with hx of dementia. found on floor by HHA, unwitnessed event. pt unable to communicate needs at baseline. hematoma noted to left jaw, and left thigh. pale skin. blood noted around lips and left ear with abrasion, dry mucous noted, refuses to open mouth for assessment. c-collar in place. no obvious deformities. NSR, VSS, + pedal pulses.

## 2018-06-21 NOTE — ED PROVIDER NOTE - CARE PLAN
Principal Discharge DX:	Subarachnoid bleed  Secondary Diagnosis:	Fracture of cervical spine without spinal cord lesion  Secondary Diagnosis:	Rib fracture

## 2018-06-21 NOTE — ED ADULT NURSE REASSESSMENT NOTE - NS ED NURSE REASSESS COMMENT FT1
Pt result reviewed, moved to room, placed on monitor, increased swelling to face, left thigh. new cbc sent to lab. New IV placed, Indwelling Cath placed with hematuria noted, UA sent. lung sounds clear. PERRLA. Abdomen tender nondistended, + bowel sounds.

## 2018-06-21 NOTE — CONSULT NOTE ADULT - ASSESSMENT
ASSESSMENT:  78y Female s/p unwitness fall, SAH, multiple fx    PLAN:   Neurologic: baseline dementia; stage 4. pain control with Morphine   Respiratory: on RA   Cardiovascular: keep normotensive  Gastrointestinal/Nutrition:   Renal/Genitourinary:  marquez in, monitor I/O's   Hematologic: trend H/H.....f/u 1600 labs  Infectious Disease: no abx   Lines/Tubes:  Endocrine: none  Disposition: ICU ASSESSMENT:  78y Female s/p SAH, unwitness fall, unknown onset, unknown LOC, multiple fractures    PLAN:   Neurologic: baseline dementia; stage 4. pain control with Morphine. Repeat head CT in the AM  Respiratory: on 2L NC...wean off as tolerated   Cardiovascular: keep normotensive  Gastrointestinal/Nutrition: NPO...PPI....Speech & Swallow consult pending   Renal/Genitourinary:  marquez in, monitor I/O's   Hematologic: trend H/H.....f/u 1600 labs  Infectious Disease: no abx   Endocrine: none  Disposition: admit to ICU ASSESSMENT:  78y Female s/p SAH, unwitness fall, unknown onset, unknown LOC, multiple fractures    PLAN:   Neurologic: baseline dementia; stage 4. pain control with Morphine. Neuro Checks Q1, started on keppra,   Repeat head CT in the AM...[place] collar for c-2 fx.  Per neurosurgery: no surgical intervention  Respiratory: on 2L NC...wean off as tolerated. Encourage IS  Cardiovascular: keep normotensive  Gastrointestinal/Nutrition: NPO...PPI....Speech & Swallow consult pending   Renal/Genitourinary:  marquez in, monitor I/O's   Hematologic: trend H/H.....f/u 1600 labs  Infectious Disease: no abx   Endocrine: none  Disposition: admit to ICU

## 2018-06-21 NOTE — CONSULT NOTE ADULT - SUBJECTIVE AND OBJECTIVE BOX
77 yo F with PMH of dementia admitted to ICU for possible fall. This morning, patient found at home by attendant screaming in pain while sitting in bed. As per home attendant, unknown if patient fell off of bed, had LOC or head trauma. She was found with blood on her face and appeared to have left wrist and hand pain. She was sent to ED for evaluation. Of note, she has a history of multiple falls in the past and she ambulates with walker at baseline.     PMH: Dementia stage IV, Leukemia in remission, Kidney CA, HLD, previous pelvis fx, previous wrist fx  PSH: kidney sx, D+C  SH: lives at home with home health attendant; daughter lives downstairs  NKDA    Home Medications:  ESCITALOPRAM TAB 5MG:  (21 Jun 2018 13:04)  ESOMEPRA MAG CAP 20MG DR:  (21 Jun 2018 13:04)  GABAPENTIN   CAP 100MG:  (21 Jun 2018 13:04)  GLEEVEC      TAB 400MG:  (21 Jun 2018 13:04)  LORAZEPAM    TAB 1MG:  (21 Jun 2018 13:04)  MEMANTINE    TAB HCL 10MG:  (21 Jun 2018 13:04)  OXYBUTYNIN   TAB 10MG ER:  (21 Jun 2018 13:04)  POT CL MICRO TAB 20MEQ ER:  (21 Jun 2018 13:04)    Vital Signs Last 24 Hrs  T(C): 37.4 (21 Jun 2018 11:30), Max: 37.4 (21 Jun 2018 09:59)  T(F): 99.3 (21 Jun 2018 11:30), Max: 99.3 (21 Jun 2018 09:59)  HR: 84 (21 Jun 2018 14:42) (81 - 97)  BP: 183/76 (21 Jun 2018 14:42) (97/55 - 185/73)  BP(mean): 115 (21 Jun 2018 14:42) (95 - 116)  RR: 15 (21 Jun 2018 14:42) (15 - 21)  SpO2: 98% (21 Jun 2018 14:42) (95% - 98%)    PE:  in apparent discomfort, not cooperative with exam  non-labored resp  C-Collar intact  no paravertebral tenderness  stable pelvis    LUE:  hematoma dorsum of left hand  skin intact  painless ROM shoulder, non-TTP  deformity to dorsum of wrist with swelling  TTP wrist  SILT distally  motor intact ain/pin/u  radial pulses palpable  wwp    RUE:  skin intact  no gross deformities  painless ROM shoulder, non-TTP  no TTP wrist  SILT distally  motor intact ain/pin/u  radial pulses palpable  wwp    RLE:  no gross deformity  skin intact   neg log roll, neg axial load  SILT distally  Motor intact ehl/fhl/ta/gs  dp pulses palpable    LLE:  no gross deformity  skin intact with some bruising  neg log roll, neg axial load  SILT distally  Motor intact ehl/fhl/ta/gs  dp pulses palpable    Imaging:  XR left wrist: dorsally angulated intraarticular distal radius fracture  Xr left hand: chronic fracture base of 5th metacarpal  XR ankle/femur/knee/tib/fib pending  CT A/P/Chest: 10th 11th rib fractures, L2 compression fx, healed bilateral inferior rami fx  CT C spine: anteroinferior C2 vertebral body avulsion fracture, multilevel spondylosis  CTH/face: nondisplaced bilateral mandible fractures, multiple SAH    Labs:                        7.7    11.85 )-----------( 135      ( 21 Jun 2018 09:26 )             22.8     06-21    137  |  99  |  22<H>  ----------------------------<  136<H>  4.2   |  21  |  1.6<H>    Ca    8.8      21 Jun 2018 06:23    TPro  6.0  /  Alb  4.0  /  TBili  0.5  /  DBili  x   /  AST  57<H>  /  ALT  32  /  AlkPhos  67  06-21    procedure: left wrist hematoma block with 10cc 2% lidocaine. Distal radius fracture reduced and placed in splint. Patient tolerated the procedure well. Post-reduction XR were taken after reduction.     A/P: 77 yo F with left distal radius fracture, left base of 5th metacarpal fracture  - LUE splint placed  - f/u post-reduction films  - f/u LLE imaging  - pain control  - NWB LUE  - DVT ppx  - monitor H/H  - care as per ICU   - Orthopaedics to follow

## 2018-06-21 NOTE — H&P ADULT - HISTORY OF PRESENT ILLNESS
77y/o F with PMHx of Dementia stage 4, presents for evaluation of possible fall, unknown onset and unknown LOC. The patient lives at home with daughter and home aide, last night she went to bed and was later heard crying for help next to her bed. The patient was taken to ED for evaluation. signs of trauma, left face hematoma, lt shoulder pain, lt wrist pain. lt thigh hematoma

## 2018-06-21 NOTE — H&P ADULT - ASSESSMENT
79y/o F with PMHx of Dementia stage 4, presents for evaluation of possible fall, unknown onset and unknown LOC. The patient lives at home with daughter and home aide, last night she went to bed and was later heard crying for help next to her bed. The patient was taken to ED for evaluation. signs of trauma, left face hematoma, lt shoulder pain, lt wrist pain. lt thigh hematoma:    PLAN:    ICU care  repeat CT head for SAH  repeat labs  x-ray of left lower extremity

## 2018-06-21 NOTE — ED PROVIDER NOTE - OBJECTIVE STATEMENT
79 yo F with hx of dementia stage 4, presents for evaluation of possible fall, unknown onset, unknown LOC, per home aide, patient was found sitting and crying for help next to her bed. NO fever, no chills, no n/v/d. Per home aide, patient went to sleep last night and then she was crying of pain. Patient is not on a blood thinner. No other symptoms reported

## 2018-06-21 NOTE — ED PROVIDER NOTE - ENMT, MLM
Airway patent, Nasal mucosa clear. Mouth with blood in oral mucosa. Throat has no vesicles, no oropharyngeal exudates and uvula is midline. There is a contusion from the angle of the left jaw to the posterior left ear.

## 2018-06-21 NOTE — ED PROVIDER NOTE - CRITICAL CARE PROVIDED
consult w/ pt's family directly relating to pts condition/direct patient care (not related to procedure)/interpretation of diagnostic studies/consultation with other physicians/documentation

## 2018-06-21 NOTE — ED PROVIDER NOTE - ATTENDING CONTRIBUTION TO CARE
pt found on floor, with head/facial trauma. unknown mechanism. pt had dementia, poor historian. pt not on ac. pt elderly, frail, with facial hematomas. ccollar placed. cta, s1s2, ab soft, nd, pelvs stable. nvi. gcs 15. will get imaging, labs, trauma consult.

## 2018-06-21 NOTE — CONSULT NOTE ADULT - SUBJECTIVE AND OBJECTIVE BOX
SICU Consultation Note  =====================================================  HPI:   79y/o F with PMHx of Dementia stage 4, presents for evaluation of possible fall, unknown onset and unknown LOC. The patient lives at home with daughter and home aide, last night she went to bed and was later heard crying for help next to her bed. The patient was taken to ED for evaluation.       PAST MEDICAL & SURGICAL HISTORY:  Dementia without behavioral disturbance, unspecified dementia type    Home Meds: Home Medications:    Allergies: Allergies    No Known Allergies    Intolerances      Soc:   Advanced Directives: Presumed Full Code     ROS:    REVIEW OF SYSTEMS    [ ] A ten-point review of systems was otherwise negative except as noted.  [ ] Due to altered mental status/intubation, subjective information were not able to be obtained from the patient. History was obtained, to the extent possible, from review of the chart and collateral sources of information.      CURRENT MEDICATIONS:   --------------------------------------------------------------------------------------  Neurologic Medications    Respiratory Medications    Cardiovascular Medications    Gastrointestinal Medications    Genitourinary Medications    Hematologic/Oncologic Medications    Antimicrobial/Immunologic Medications    Endocrine/Metabolic Medications    Topical/Other Medications    --------------------------------------------------------------------------------------    VITAL SIGNS, INS/OUTS (last 24 hours):  --------------------------------------------------------------------------------------  ICU Vital Signs Last 24 Hrs  T(C): 37.4 (21 Jun 2018 11:30), Max: 37.4 (21 Jun 2018 09:59)  T(F): 99.3 (21 Jun 2018 11:30), Max: 99.3 (21 Jun 2018 09:59)  HR: 88 (21 Jun 2018 11:30) (81 - 97)  BP: 127/67 (21 Jun 2018 11:30) (97/55 - 172/114)  BP(mean): --  ABP: --  ABP(mean): --  RR: 18 (21 Jun 2018 11:30) (18 - 19)  SpO2: 97% (21 Jun 2018 11:30) (95% - 98%)    I&O's Summary    21 Jun 2018 07:01  -  21 Jun 2018 12:14  --------------------------------------------------------  IN: 2395 mL / OUT: 0 mL / NET: 2395 mL      --------------------------------------------------------------------------------------    EXAM:  General/Neuro  RASS:   GCS:   Exam: Normal, NAD, alert, oriented x 3, no focal deficits. PERRLA  ***    Respiratory  Exam: Lungs clear to auscultation, Normal expansion/effort.  ***  [] Tracheostomy   [] Intubated  Mechanical Ventilation:     Cardiovascular  Exam: S1, S2.  Regular rate and rhythm.  Peripheral edema  ***  Cardiac Rhythm: Normal Sinus Rhythm  ECHO:     GI  Exam: Abdomen soft, Non-tender, Non-distended.  Gastrostomy / Jejunostomy tube in place.  Nasogastric tube in place.  Colostomy / Ileostomy.  ***  Wound:   ***  Current Diet:  NPO***      Tubes/Lines/Drains  ***  [x] Peripheral IV  [] Central Venous Line     	[] R	[] L	[] IJ	[] Fem	[] SC        Type:	    Date Placed:   [] Arterial Line		[] R	[] L	[] Fem	[] Rad	[] Ax	Date Placed:   [] PICC:         	[] Midline		[] Mediport           [] Urinary Catheter		Date Placed:     Extremities  Exam: Extremities warm, pink, well-perfused.        Derm:  Exam: Good skin turgor, no skin breakdown.      :   Exam: Urban catheter in place.     LABS  --------------------------------------------------------------------------------------  Labs:  CAPILLARY BLOOD GLUCOSE                              7.7    11.85 )-----------( 135      ( 21 Jun 2018 09:26 )             22.8       Auto Neutrophil %: 87.0 % (06-21-18 @ 06:23)  Auto Immature Granulocyte %: 0.7 % (06-21-18 @ 06:23)    06-21    137  |  99  |  22<H>  ----------------------------<  136<H>  4.2   |  21  |  1.6<H>      Calcium, Total Serum: 8.8 mg/dL (06-21-18 @ 06:23)      LFTs:             6.0  | 0.5  | 57       ------------------[67      ( 21 Jun 2018 06:23 )  4.0  | x    | 32          Lipase:60     Amylase:x         Blood Gas Venous - Lactate: 2.5 mmoL/L (06-21-18 @ 06:34)  Lactate, Blood: 2.7 mmol/L (06-21-18 @ 06:23)      Coags:     12.30  ----< 1.14    ( 21 Jun 2018 06:23 )     28.1            Urinalysis Basic - ( 21 Jun 2018 06:23 )    Color: Dark Yellow / Appearance: Cloudy / SG: >=1.030 / pH: x  Gluc: x / Ketone: Negative  / Bili: Negative / Urobili: 1.0   Blood: x / Protein: 100 / Nitrite: Negative   Leuk Esterase: Negative / RBC: >50 /HPF / WBC x   Sq Epi: x / Non Sq Epi: x / Bacteria: x          --------------------------------------------------------------------------------------    OTHER LABS    IMAGING RESULTS      ASSESSMENT:  78y Female ***    PLAN:   Neurologic:   Respiratory:   Cardiovascular:   Gastrointestinal/Nutrition:   Renal/Genitourinary:   Hematologic:   Infectious Disease:   Lines/Tubes:  Endocrine:   Disposition:     --------------------------------------------------------------------------------------    Critical Care Diagnoses: SICU Consultation Note  =====================================================  HPI:   77y/o F with PMHx of Dementia stage 4, presents for evaluation of possible fall, unknown onset and unknown LOC. The patient lives at home with daughter and home aide, last night she went to bed and was later heard crying for help next to her bed. The patient was taken to ED for evaluation of signs of trauma, left face hematoma, left shoulder pain, left wrist pain and left thigh hematoma. Head CT showed Small scattered left-sided subarachnoid hemorrhage but no significant mass effect. CT of cervical spine showed minimally displaced avulsion fracture of the anterior-inferior C2, vertebral body with mild widening of the anterior C2-C3 disc space, consistent with extension teardrop fracture. CT Chest with IV contrast showed acute fractures of the left posterior 10th and 11th, new indeterminate 2.2 cm left suprarenal mass and no pneumothorax. CT maxillofacial showed acute nondisplaced fractures of the mandibular bodies bilaterally. The patient was transfered to ICU for continued care       PAST MEDICAL & SURGICAL HISTORY:  Dementia without behavioral disturbance, unspecified dementia type    Home Meds: Home Medications:    Allergies:     No Known Allergies      Soc:   Advanced Directives: Presumed Full Code     ROS:    REVIEW OF SYSTEMS    [ ] A ten-point review of systems was otherwise negative except as noted.  [X ] Due to altered mental status/intubation, subjective information were not able to be obtained from the patient. History was obtained, to the extent possible, from review of the chart and collateral sources of information.      CURRENT MEDICATIONS:   --------------------------------------------------------------------------------------  Neurologic Medications    Respiratory Medications    Cardiovascular Medications    Gastrointestinal Medications    Genitourinary Medications    Hematologic/Oncologic Medications    Antimicrobial/Immunologic Medications    Endocrine/Metabolic Medications    Topical/Other Medications    --------------------------------------------------------------------------------------    VITAL SIGNS, INS/OUTS (last 24 hours):  --------------------------------------------------------------------------------------  ICU Vital Signs Last 24 Hrs  T(C): 37.4 (21 Jun 2018 11:30), Max: 37.4 (21 Jun 2018 09:59)  T(F): 99.3 (21 Jun 2018 11:30), Max: 99.3 (21 Jun 2018 09:59)  HR: 88 (21 Jun 2018 11:30) (81 - 97)  BP: 127/67 (21 Jun 2018 11:30) (97/55 - 172/114)  BP(mean): --  ABP: --  ABP(mean): --  RR: 18 (21 Jun 2018 11:30) (18 - 19)  SpO2: 97% (21 Jun 2018 11:30) (95% - 98%)    I&O's Summary    21 Jun 2018 07:01  -  21 Jun 2018 12:14  --------------------------------------------------------  IN: 2395 mL / OUT: 0 mL / NET: 2395 mL      --------------------------------------------------------------------------------------    EXAM:  General/Neuro: confused, mumbling ( baseline dementia, stage 4)  NecK: [place] j collar in place   hematoma noted to left jaw, blood noted around lips and left ear with abrasion,    Respiratory  Exam: Lungs clear to auscultation,     Cardiovascular  Exam: S1, S2.  Regular rate and rhythm.   Cardiac Rhythm: Normal Sinus Rhythm    GI  Exam: Abdomen soft, Non-tender, Non-distended.  +BS    : marquez in placed with noted hematuria     Extremities  Exam: Extremities warm, pink, well-perfused.      Extremities:  palpable pulses b/l    Derm:  Exam: left thigh hematoma noted, pale skin         Current Diet:  NPO***      Tubes/Lines/Drains  ***  [x] Peripheral IV        LABS  --------------------------------------------------------------------------------------  Labs:  CAPILLARY BLOOD GLUCOSE                              7.7    11.85 )-----------( 135      ( 21 Jun 2018 09:26 )             22.8       Auto Neutrophil %: 87.0 % (06-21-18 @ 06:23)  Auto Immature Granulocyte %: 0.7 % (06-21-18 @ 06:23)    06-21    137  |  99  |  22<H>  ----------------------------<  136<H>  4.2   |  21  |  1.6<H>      Calcium, Total Serum: 8.8 mg/dL (06-21-18 @ 06:23)      LFTs:             6.0  | 0.5  | 57       ------------------[67      ( 21 Jun 2018 06:23 )  4.0  | x    | 32          Lipase:60     Amylase:x         Blood Gas Venous - Lactate: 2.5 mmoL/L (06-21-18 @ 06:34)  Lactate, Blood: 2.7 mmol/L (06-21-18 @ 06:23)      Coags:     12.30  ----< 1.14    ( 21 Jun 2018 06:23 )     28.1            Urinalysis Basic - ( 21 Jun 2018 06:23 )    Color: Dark Yellow / Appearance: Cloudy / SG: >=1.030 / pH: x  Gluc: x / Ketone: Negative  / Bili: Negative / Urobili: 1.0   Blood: x / Protein: 100 / Nitrite: Negative   Leuk Esterase: Negative / RBC: >50 /HPF / WBC x   Sq Epi: x / Non Sq Epi: x / Bacteria: x          --------------------------------------------------------------------------------------    OTHER LABS    IMAGING RESULTS        --------------------------------------------------------------------------------------    Critical Care Diagnoses: SICU Consultation Note  =====================================================  HPI:   79y/o F with PMHx of Dementia stage 4, presents for evaluation of possible fall, unknown onset and unknown LOC. The patient lives at home with daughter and home aide, last night she went to bed and was later heard crying for help next to her bed. The patient was taken to ED for evaluation of signs of trauma, left face hematoma, left shoulder pain, left wrist pain and left thigh hematoma. Head CT showed Small scattered left-sided subarachnoid hemorrhage but no significant mass effect. CT of cervical spine showed minimally displaced avulsion fracture of the anterior-inferior C2, vertebral body with mild widening of the anterior C2-C3 disc space, consistent with extension teardrop fracture. CT Chest with IV contrast showed acute fractures of the left posterior 10th and 11th, new indeterminate 2.2 cm left suprarenal mass and no pneumothorax. CT maxillofacial showed acute nondisplaced fractures of the mandibular bodies bilaterally. The patient was transfered to ICU for continued care       PAST MEDICAL & SURGICAL HISTORY:  PMH: Dementia stage IV, Leukemia in remission, Kidney CA, HLD, previous pelvis fx, previous wrist fx  PSH: kidney sx, D+C  SH: lives at home with home health attendant; daughter lives downstair    Home Meds: Home Medications:    Allergies:   No Known Allergies      Soc:   Advanced Directives: Presumed Full Code     ROS:    REVIEW OF SYSTEMS    [ ] A ten-point review of systems was otherwise negative except as noted.  [X ] Due to altered mental status/intubation, subjective information were not able to be obtained from the patient. History was obtained, to the extent possible, from review of the chart and collateral sources of information.      CURRENT MEDICATIONS:   --------------------------------------------------------------------------------------  Neurologic Medications    Respiratory Medications    Cardiovascular Medications    Gastrointestinal Medications    Genitourinary Medications    Hematologic/Oncologic Medications    Antimicrobial/Immunologic Medications    Endocrine/Metabolic Medications    Topical/Other Medications    --------------------------------------------------------------------------------------    VITAL SIGNS, INS/OUTS (last 24 hours):  --------------------------------------------------------------------------------------  ICU Vital Signs Last 24 Hrs  T(C): 37.4 (21 Jun 2018 11:30), Max: 37.4 (21 Jun 2018 09:59)  T(F): 99.3 (21 Jun 2018 11:30), Max: 99.3 (21 Jun 2018 09:59)  HR: 88 (21 Jun 2018 11:30) (81 - 97)  BP: 127/67 (21 Jun 2018 11:30) (97/55 - 172/114)  BP(mean): --  ABP: --  ABP(mean): --  RR: 18 (21 Jun 2018 11:30) (18 - 19)  SpO2: 97% (21 Jun 2018 11:30) (95% - 98%)    I&O's Summary    21 Jun 2018 07:01  -  21 Jun 2018 12:14  --------------------------------------------------------  IN: 2395 mL / OUT: 0 mL / NET: 2395 mL      --------------------------------------------------------------------------------------    EXAM:  General/Neuro: confused, mumbling ( baseline dementia, stage 4)  NecK: [place] j collar in place   hematoma noted to left jaw, blood noted around lips and left ear with abrasion,    Respiratory  Exam: Lungs clear to auscultation,     Cardiovascular  Exam: S1, S2.  Regular rate and rhythm.   Cardiac Rhythm: Normal Sinus Rhythm    GI  Exam: Abdomen soft, Non-tender, Non-distended.  +BS    : marquez in placed with noted hematuria     Extremities  Exam: Extremities warm, pink, well-perfused.      Extremities:  palpable pulses b/l    Derm:  Exam: left thigh hematoma noted, pale skin         Current Diet:  NPO***      Tubes/Lines/Drains  ***  [x] Peripheral IV        LABS  --------------------------------------------------------------------------------------  Labs:  CAPILLARY BLOOD GLUCOSE                              7.7    11.85 )-----------( 135      ( 21 Jun 2018 09:26 )             22.8       Auto Neutrophil %: 87.0 % (06-21-18 @ 06:23)  Auto Immature Granulocyte %: 0.7 % (06-21-18 @ 06:23)    06-21    137  |  99  |  22<H>  ----------------------------<  136<H>  4.2   |  21  |  1.6<H>      Calcium, Total Serum: 8.8 mg/dL (06-21-18 @ 06:23)      LFTs:             6.0  | 0.5  | 57       ------------------[67      ( 21 Jun 2018 06:23 )  4.0  | x    | 32          Lipase:60     Amylase:x         Blood Gas Venous - Lactate: 2.5 mmoL/L (06-21-18 @ 06:34)  Lactate, Blood: 2.7 mmol/L (06-21-18 @ 06:23)      Coags:     12.30  ----< 1.14    ( 21 Jun 2018 06:23 )     28.1            Urinalysis Basic - ( 21 Jun 2018 06:23 )    Color: Dark Yellow / Appearance: Cloudy / SG: >=1.030 / pH: x  Gluc: x / Ketone: Negative  / Bili: Negative / Urobili: 1.0   Blood: x / Protein: 100 / Nitrite: Negative   Leuk Esterase: Negative / RBC: >50 /HPF / WBC x   Sq Epi: x / Non Sq Epi: x / Bacteria: x          --------------------------------------------------------------------------------------    OTHER LABS    IMAGING RESULTS    maging:  XR left wrist: dorsally angulated intraarticular distal radius fracture  Xr left hand: chronic fracture base of 5th metacarpal  XR ankle/femur/knee/tib/fib pending  CT A/P/Chest: 10th 11th rib fractures, L2 compression fx, healed bilateral inferior rami fx  CT C spine: anteroinferior C2 vertebral body avulsion fracture, multilevel spondylosis  CTH/face: nondisplaced bilateral mandible fractures, multiple SAH          --------------------------------------------------------------------------------------    Critical Care Diagnoses:

## 2018-06-21 NOTE — ED ADULT TRIAGE NOTE - CHIEF COMPLAINT QUOTE
pt found in bed by daughter with bruising to the left jaw, behind the left ear, and bleeding from the tongue. pt has dementia, unsure if pt fell. no AC

## 2018-06-22 DIAGNOSIS — R52 PAIN, UNSPECIFIED: ICD-10-CM

## 2018-06-22 DIAGNOSIS — Z51.5 ENCOUNTER FOR PALLIATIVE CARE: ICD-10-CM

## 2018-06-22 DIAGNOSIS — Z71.89 OTHER SPECIFIED COUNSELING: ICD-10-CM

## 2018-06-22 LAB
CK SERPL-CCNC: 805 U/L — HIGH (ref 0–225)
TROPONIN T SERPL-MCNC: <0.01 NG/ML — SIGNIFICANT CHANGE UP

## 2018-06-22 RX ORDER — OXYBUTYNIN CHLORIDE 5 MG
10 TABLET ORAL DAILY
Qty: 0 | Refills: 0 | Status: DISCONTINUED | OUTPATIENT
Start: 2018-06-22 | End: 2018-07-02

## 2018-06-22 RX ORDER — LABETALOL HCL 100 MG
10 TABLET ORAL ONCE
Qty: 0 | Refills: 0 | Status: COMPLETED | OUTPATIENT
Start: 2018-06-22 | End: 2018-06-22

## 2018-06-22 RX ORDER — MORPHINE SULFATE 50 MG/1
5 CAPSULE, EXTENDED RELEASE ORAL EVERY 6 HOURS
Qty: 0 | Refills: 0 | Status: DISCONTINUED | OUTPATIENT
Start: 2018-06-22 | End: 2018-06-23

## 2018-06-22 RX ORDER — MEMANTINE HYDROCHLORIDE 10 MG/1
10 TABLET ORAL DAILY
Qty: 0 | Refills: 0 | Status: DISCONTINUED | OUTPATIENT
Start: 2018-06-22 | End: 2018-07-02

## 2018-06-22 RX ORDER — ESCITALOPRAM OXALATE 10 MG/1
5 TABLET, FILM COATED ORAL DAILY
Qty: 0 | Refills: 0 | Status: DISCONTINUED | OUTPATIENT
Start: 2018-06-22 | End: 2018-07-02

## 2018-06-22 RX ORDER — ACETAMINOPHEN 500 MG
650 TABLET ORAL EVERY 4 HOURS
Qty: 0 | Refills: 0 | Status: DISCONTINUED | OUTPATIENT
Start: 2018-06-22 | End: 2018-06-24

## 2018-06-22 RX ORDER — LEVETIRACETAM 250 MG/1
500 TABLET, FILM COATED ORAL EVERY 12 HOURS
Qty: 0 | Refills: 0 | Status: DISCONTINUED | OUTPATIENT
Start: 2018-06-22 | End: 2018-07-02

## 2018-06-22 RX ORDER — HEPARIN SODIUM 5000 [USP'U]/ML
5000 INJECTION INTRAVENOUS; SUBCUTANEOUS EVERY 8 HOURS
Qty: 0 | Refills: 0 | Status: DISCONTINUED | OUTPATIENT
Start: 2018-06-22 | End: 2018-06-29

## 2018-06-22 RX ORDER — GABAPENTIN 400 MG/1
100 CAPSULE ORAL DAILY
Qty: 0 | Refills: 0 | Status: DISCONTINUED | OUTPATIENT
Start: 2018-06-22 | End: 2018-07-02

## 2018-06-22 RX ORDER — LEVETIRACETAM 250 MG/1
500 TABLET, FILM COATED ORAL EVERY 12 HOURS
Qty: 0 | Refills: 0 | Status: DISCONTINUED | OUTPATIENT
Start: 2018-06-22 | End: 2018-06-22

## 2018-06-22 RX ORDER — AMLODIPINE BESYLATE 2.5 MG/1
5 TABLET ORAL DAILY
Qty: 0 | Refills: 0 | Status: DISCONTINUED | OUTPATIENT
Start: 2018-06-22 | End: 2018-06-27

## 2018-06-22 RX ORDER — MORPHINE SULFATE 50 MG/1
2 CAPSULE, EXTENDED RELEASE ORAL ONCE
Qty: 0 | Refills: 0 | Status: DISCONTINUED | OUTPATIENT
Start: 2018-06-22 | End: 2018-06-22

## 2018-06-22 RX ADMIN — Medication 650 MILLIGRAM(S): at 13:30

## 2018-06-22 RX ADMIN — MORPHINE SULFATE 5 MILLIGRAM(S): 50 CAPSULE, EXTENDED RELEASE ORAL at 20:40

## 2018-06-22 RX ADMIN — LEVETIRACETAM 420 MILLIGRAM(S): 250 TABLET, FILM COATED ORAL at 01:42

## 2018-06-22 RX ADMIN — MORPHINE SULFATE 2 MILLIGRAM(S): 50 CAPSULE, EXTENDED RELEASE ORAL at 16:13

## 2018-06-22 RX ADMIN — Medication 10 MILLIGRAM(S): at 08:30

## 2018-06-22 RX ADMIN — Medication 650 MILLIGRAM(S): at 05:02

## 2018-06-22 RX ADMIN — Medication 650 MILLIGRAM(S): at 14:00

## 2018-06-22 RX ADMIN — LEVETIRACETAM 420 MILLIGRAM(S): 250 TABLET, FILM COATED ORAL at 12:34

## 2018-06-22 RX ADMIN — PANTOPRAZOLE SODIUM 40 MILLIGRAM(S): 20 TABLET, DELAYED RELEASE ORAL at 13:53

## 2018-06-22 RX ADMIN — Medication 25 GRAM(S): at 01:03

## 2018-06-22 RX ADMIN — Medication 650 MILLIGRAM(S): at 10:50

## 2018-06-22 RX ADMIN — HEPARIN SODIUM 5000 UNIT(S): 5000 INJECTION INTRAVENOUS; SUBCUTANEOUS at 23:04

## 2018-06-22 RX ADMIN — SODIUM CHLORIDE 75 MILLILITER(S): 9 INJECTION INTRAMUSCULAR; INTRAVENOUS; SUBCUTANEOUS at 03:35

## 2018-06-22 RX ADMIN — HEPARIN SODIUM 5000 UNIT(S): 5000 INJECTION INTRAVENOUS; SUBCUTANEOUS at 13:53

## 2018-06-22 RX ADMIN — AMLODIPINE BESYLATE 5 MILLIGRAM(S): 2.5 TABLET ORAL at 13:53

## 2018-06-22 RX ADMIN — Medication 650 MILLIGRAM(S): at 11:30

## 2018-06-22 RX ADMIN — Medication 10 MILLIGRAM(S): at 03:32

## 2018-06-22 NOTE — PROGRESS NOTE ADULT - SUBJECTIVE AND OBJECTIVE BOX
KYM ROTH  [id number]  78y Female    Indication for ICU admission:  Admit Date:06-21-18  ICU Date:  OR Date:     HPI 77y/o F with PMHx of Dementia stage 4, presents for evaluation of possible fall, unknown onset and unknown LOC. The patient lives at home with daughter and home aide, last night she went to bed and was later heard crying for help next to her bed. The patient was taken to ED for evaluation of signs of trauma, left face hematoma, left shoulder pain, left wrist pain and left thigh hematoma. Head CT showed Small scattered left-sided subarachnoid hemorrhage but no significant mass effect. CT of cervical spine showed minimally displaced avulsion fracture of the anterior-inferior C2, vertebral body with mild widening of the anterior C2-C3 disc space, consistent with extension teardrop fracture. CT Chest with IV contrast showed acute fractures of the left posterior 10th and 11th, new indeterminate 2.2 cm left suprarenal mass and no pneumothorax. CT maxillofacial showed acute nondisplaced fractures of the mandibular bodies bilaterally. The patient was transfered to ICU for continued care     No Known Allergies    PAST MEDICAL & SURGICAL HISTORY:  PMH: Dementia stage IV, Leukemia in remission, Kidney CA, HLD, previous pelvis fx, previous wrist fx  PSH: kidney sx, D+C  SH: lives at home with home health attendant; daughter lives downstairs  NKDA      Home Medications:  ESCITALOPRAM TAB 5MG:  (21 Jun 2018 13:04)  ESOMEPRA MAG CAP 20MG DR:  (21 Jun 2018 13:04)  GABAPENTIN   CAP 100MG:  (21 Jun 2018 13:04)  GLEEVEC      TAB 400MG:  (21 Jun 2018 13:04)  LORAZEPAM    TAB 1MG:  (21 Jun 2018 13:04)  MEMANTINE    TAB HCL 10MG:  (21 Jun 2018 13:04)  OXYBUTYNIN   TAB 10MG ER:  (21 Jun 2018 13:04)  POT CL MICRO TAB 20MEQ ER:  (21 Jun 2018 13:04)      24HRS EVENT:  Neuro: Baseline Demetia stage 4... Pain control with morphine prn. neuro checks q1...started on keppra... per NS: no surgical intervention...Daisy DEE for C-2 fx  repeat head CT on Friday   Resp: On 2L NC...Encourage IS   CVS: keep normotensive  GI: NPO, PPI, speech and swallow consult  : Wilmar in...monitor UO  Renal: f/u 23:30 labs   Heme: trend H/H...f/u 2300 labs.    ID: no abx.   Endocrine: none.     Disposition: admit to ICU  AT night: Hgb 9.2 (7.7). still lethargic, received 2 Mg for hypomagmnesimea. Family is deciding about code status.  Dentasl evaluted for mandibular fracture, recommended Unasyn but Dr. Myles does not recommend to start. Official OMFS evaluation in am.  Home lorazepam, escitalopram, gabapentin, gleevec were not restarted.  Family wants fullcode for now after extensive discussion.  2 sets of troponins pending in am.  Morphine stopped. Tylenol suppository PRN.  Labetalol 10 mg IV x 1 given for  systolic.    Code status: Fullcode    DVT PTX: SCD    GI PTX: PPI    ***Tubes/Lines/Drains  ***  Peripheral IV      Urinary Catheter		Indication: Strict I&O    Date Placed: 6/21/18      REVIEW OF SYSTEMS    [ ] A ten-point review of systems was otherwise negative except as noted.  [ X] Due to altered mental status/intubation, subjective information were not able to be obtained from the patient. History was obtained, to the extent possible, from review of the chart and collateral sources of information. KYM ROTH  [id number]  78y Female    Indication for ICU admission:  Admit Date:06-21-18  ICU Date:  OR Date:     HPI 77y/o F with PMHx of Dementia stage 4, presents for evaluation of possible fall, unknown onset and unknown LOC. The patient lives at home with daughter and home aide, last night she went to bed and was later heard crying for help next to her bed. The patient was taken to ED for evaluation of signs of trauma, left face hematoma, left shoulder pain, left wrist pain and left thigh hematoma. Head CT showed Small scattered left-sided subarachnoid hemorrhage but no significant mass effect. CT of cervical spine showed minimally displaced avulsion fracture of the anterior-inferior C2, vertebral body with mild widening of the anterior C2-C3 disc space, consistent with extension teardrop fracture. CT Chest with IV contrast showed acute fractures of the left posterior 10th and 11th, new indeterminate 2.2 cm left suprarenal mass and no pneumothorax. CT maxillofacial showed acute nondisplaced fractures of the mandibular bodies bilaterally. The patient was transfered to ICU for continued care     No Known Allergies    PAST MEDICAL & SURGICAL HISTORY:  PMH: Dementia stage IV, Leukemia in remission, Kidney CA, HLD, previous pelvis fx, previous wrist fx  PSH: kidney sx, D+C  SH: lives at home with home health attendant; daughter lives downstairs  NKDA      Home Medications:  ESCITALOPRAM TAB 5MG:  (21 Jun 2018 13:04)  ESOMEPRA MAG CAP 20MG DR:  (21 Jun 2018 13:04)  GABAPENTIN   CAP 100MG:  (21 Jun 2018 13:04)  GLEEVEC      TAB 400MG:  (21 Jun 2018 13:04)  LORAZEPAM    TAB 1MG:  (21 Jun 2018 13:04)  MEMANTINE    TAB HCL 10MG:  (21 Jun 2018 13:04)  OXYBUTYNIN   TAB 10MG ER:  (21 Jun 2018 13:04)  POT CL MICRO TAB 20MEQ ER:  (21 Jun 2018 13:04)      24HRS EVENT:  Neuro: Baseline Demetia stage 4... Pain control with morphine prn. neuro checks q1...started on keppra... per NS: no surgical intervention...Daisy DEE for C-2 fx  repeat head CT on Friday   Resp: On 2L NC...Encourage IS   CVS: keep normotensive  GI: NPO, PPI, speech and swallow consult  : Wilmar in...monitor UO  Renal: cr was 1.6> 1.4  Heme: Hg 7.7 >9.2  ID: no abx.   Endocrine: none.     Disposition: admit to ICU  AT night: Hgb 9.2 (7.7). still lethargic, received 2 Mg for hypomagnesimea. Family is deciding about code status.  Dental evaluated for mandibular fracture, recommended Unasyn but Dr. Myles does not recommend to start. Official OMFS evaluation in am.  Home lorazepam, escitalopram, gabapentin, gleevec were not restarted.  Family wants fullcode for now after extensive discussion.  2 sets of troponins pending in am.  Morphine stopped. Tylenol suppository PRN.  Labetalol 10 mg IV x 1 given for  systolic.    Code status: Fullcode    DVT PTX: SCD    GI PTX: PPI    ***Tubes/Lines/Drains  ***  Peripheral IV      Urinary Catheter		Indication: Strict I&O    Date Placed: 6/21/18      REVIEW OF SYSTEMS    [ ] A ten-point review of systems was otherwise negative except as noted.  [ X] Due to altered mental status/intubation, subjective information were not able to be obtained from the patient. History was obtained, to the extent possible, from review of the chart and collateral sources of information.  Daily Height in cm: 154.94 (21 Jun 2018 14:17)    Daily     Diet, NPO (06-21-18 @ 13:02)      CURRENT MEDS:  Neurologic Medications  acetaminophen  Suppository. 650 milliGRAM(s) Rectal every 4 hours  levETIRAcetam  IVPB 500 milliGRAM(s) IV Intermittent every 12 hours    Respiratory Medications    Cardiovascular Medications    Gastrointestinal Medications  pantoprazole  Injectable 40 milliGRAM(s) IV Push daily  sodium chloride 0.9%. 1000 milliLiter(s) IV Continuous <Continuous>    Genitourinary Medications    Hematologic/Oncologic Medications    Antimicrobial/Immunologic Medications    Endocrine/Metabolic Medications    Topical/Other Medications      ICU Vital Signs Last 24 Hrs  T(C): 37.3 (22 Jun 2018 05:30), Max: 37.4 (21 Jun 2018 09:59)  T(F): 99.2 (22 Jun 2018 05:30), Max: 99.3 (21 Jun 2018 09:59)  HR: 88 (22 Jun 2018 07:00) (78 - 100)  BP: 176/68 (22 Jun 2018 07:00) (97/55 - 187/87)  BP(mean): 96 (22 Jun 2018 07:00) (68 - 133)  ABP: --  ABP(mean): --  RR: 13 (22 Jun 2018 07:00) (11 - 21)  SpO2: 99% (22 Jun 2018 07:00) (92% - 99%)        I&O's Summary    21 Jun 2018 07:01  -  22 Jun 2018 07:00  --------------------------------------------------------  IN: 3745 mL / OUT: 930 mL / NET: 2815 mL      I&O's Detail    21 Jun 2018 07:01  -  22 Jun 2018 07:00  --------------------------------------------------------  IN:    IV PiggyBack: 100 mL    Lactated Ringers IV Bolus: 2000 mL    PRBCs (Packed Red Blood Cells): 295 mL    sodium chloride 0.9%.: 1350 mL  Total IN: 3745 mL    OUT:    Indwelling Catheter - Urethral: 930 mL  Total OUT: 930 mL    Total NET: 2815 mL          PHYSICAL EXAM:    General/Neuro  Deficits:                             baseline dementia stage 4, followed command with UE  Pupils: PERRLA    Lungs:      clear to auscultation, Normal expansion/effort.     Cardiovascular : S1, S2.  Regular rate and rhythm.  No  Peripheral edema   Cardiac Rhythm: Normal Sinus Rhythm    GI: Abdomen soft, Non-tender, Non-distended.  +BS    Extremities: Extremities warm, pink, well-perfused. Pulses:palpable b/l  lt thigh hematoma,soft, tender     Derm: Good skin turgor, no skin breakdown.      :       Urban catheter in place.      CXR:     LABS:  CAPILLARY BLOOD GLUCOSE                   9.2    9.13  )-----------( 130      ( 21 Jun 2018 21:55 )             26.0       06-21    139  |  103  |  24<H>  ----------------------------<  134<H>  4.4   |  24  |  1.4    Ca    8.5      21 Jun 2018 21:55  Phos  3.9     06-21  Mg     1.7     06-21    TPro  6.0  /  Alb  4.0  /  TBili  0.5  /  DBili  x   /  AST  57<H>  /  ALT  32  /  AlkPhos  67  06-21      PT/INR - ( 21 Jun 2018 06:23 )   PT: 12.30 sec;   INR: 1.14 ratio         PTT - ( 21 Jun 2018 06:23 )  PTT:28.1 sec  CARDIAC MARKERS ( 21 Jun 2018 21:55 )  x     / <0.01 ng/mL / x     / x     / x          Urinalysis Basic - ( 21 Jun 2018 06:23 )    Color: Dark Yellow / Appearance: Cloudy / SG: >=1.030 / pH: x  Gluc: x / Ketone: Negative  / Bili: Negative / Urobili: 1.0   Blood: x / Protein: 100 / Nitrite: Negative   Leuk Esterase: Negative / RBC: >50 /HPF / WBC x   Sq Epi: x / Non Sq Epi: x / Bacteria: x

## 2018-06-22 NOTE — CONSULT NOTE ADULT - SUBJECTIVE AND OBJECTIVE BOX
REQUESTED OF: DR Sewell    Chart rev'd; d/w Surgical PA, Fitz RN  Nialexander Patten at beside  KYM ROTH 78yFemale  HPI:  77y/o F with PMHx of Dementia stage 4, presents for evaluation of possible fall, unknown onset and unknown LOC. The patient lives at home with daughter and home aide, last night she went to bed and was later heard crying for help next to her bed. The patient was taken to ED for evaluation. signs of trauma, left face hematoma, lt shoulder pain, lt wrist pain. lt thigh hematoma (21 Jun 2018 12:38)    PAST MEDICAL & SURGICAL HISTORY:  Dementia without behavioral disturbance, unspecified dementia type  No significant past surgical history    Niece described concern over course of events - uncertainity about this fall and described another fall within last year. States there has been decline over the year and that patient is dependent with IADLs and is able to feed self; incontinent at night time    PHYSICAL EXAM:   Constitutional:Frail older adult; moaning; unable to follow commands; verbalize needs    Eyes: PER    Neck: cervical collar in place    Respiratory: No distress/depress    Cardiovascular: SR on tele    Gastrointestinal: soft    Genitourinary: marquez removed DTV    Extremities: no edema    Neurological: difficut to assess since not able to follow commands (demands)    Skin: multiple ecchymotic areas; as per nursing    Musculoskeletal: left arm immobiliation in place; fingers warm good capillary refill    T(C): 37.3, Max: 37.3 (00:00)  HR: 84 (70 - 100)  BP: 180/69 (137/61 - 187/87)  RR: 15 (11 - 19)  SpO2: 97% (92% - 99%)      LABS/STUDIES:  06-21    139  |  103  |  24<H>  ----------------------------<  134<H>  4.4   |  24  |  1.4    Ca    8.5      21 Jun 2018 21:55  Phos  3.9     06-21  Mg     1.7     06-21    TPro  6.0  /  Alb  4.0  /  TBili  0.5  /  DBili  x   /  AST  57<H>  /  ALT  32  /  AlkPhos  67  06-21                         9.2    9.13  )-----------( 130      ( 21 Jun 2018 21:55 )             26.0    GFR 30's        < from: CT Head No Cont (06.21.18 @ 08:36) >   1.  Small scattered left-sided subarachnoid hemorrhage. No significant   mass effect.   2.  Stable moderate chronic microvascular changes and cerebral atrophy.   < end of copied text >   < from: CT Cervical Spine No Cont (06.21.18 @ 08:36) >   1.  Minimally displaced avulsion fracture of the anterior-inferior C2   vertebral body with mild widening of the anterior C2-C3 disc space,   consistent with extension teardrop fracture. Consider MRI cervical spine   for evaluation for spinal cord injury.   2.  Associated prevertebral edema/hematoma greater on the left side.   3.  Multilevel multilevel severe degenerative changes notable for C3-4   severe spinal stenosis due to a central disc herniation (stable to   slightly increased since the prior exam), and multilevel moderate-severe   neural foraminal stenosis. Diffuse osteopenia.   < end of copied text >   < from: CT Chest w/ IV Cont (06.21.18 @ 08:36) >   IMPRESSION:   1. Acute fractures of the left posterior 10th and 11th ribs; no   pneumothorax.  2. New, indeterminate 2.2 cm left suprarenal mass or lymph node. Consider   adrenal protocol MRI abdomen with and without IV contrast, following   discharge from hospital.   < end of copied text >   < from: CT Maxillofacial No Cont (06.21.18 @ 08:35) >    IMPRESSION:   1.  Acute nondisplaced fractures of the mandibular bodies bilaterally.   2.  Extensive overlying soft tissue swelling and hematoma which extends   deep to the inner margin of the mandible, sublingual space, oropharynx   and tongue base; as well as the  space.   3.  Enlargement and heterogeneity of the left submandibular gland may   reflect a hematoma, recommend clinical follow-up with follow-up imaging   as appropriate.   4.  C2 fracture with extensive prevertebral edema/hematoma, see   accompanying cervical spine CT.   < end of copied text >	    PROCEDURE DATE:  06/21/2018        INTERPRETATION:  CLINICAL STATEMENT: Rib fractures. Trauma.    TECHNIQUE: Contiguous axial CT images were obtained from the thoracic   inlet to the pubic symphysis following administration of 100 mL Optiray   320 intravenous contrast.  Oral contrast was not administered.    Reformatted images in the coronal and sagittal planes were acquired.    COMPARISON CT: CT abdomen and pelvis from June 5, 2017     OTHER STUDIES USED FOR CORRELATION: CT chest from October 31, 2008     FINDINGS:    CHEST:    LUNGS: No pneumothorax, pleural effusion or consolidation. Bibasilar   dependent atelectasis.    PLEURA/PERICARDIUM: No pericardial effusion. Coronary artery and thoracic   aortic ossifications.    MEDIASTINUM/THORACIC NODES: No supraclavicular, axillary or thoracic   lymphadenopathy.      ABDOMEN/PELVIS:    HEPATOBILIARY: Cholelithiasis. Liver unremarkable. No biliary dilation.    SPLEEN: Unremarkable.    PANCREAS: Unremarkable.    ADRENAL GLANDS: New, indeterminate 2.2 cm left suprarenal mass or lymph   node (series 4, image 220)    KIDNEYS/ABDOMINOPELVIC NODES: Symmetric enhancement bilaterally. Multiple   left renal subcentimeter hypodensities too small to further characterize   (series 4, image 203).     PELVIC ORGANS: Calcified fibroid uterus.    PERITONEUM/MESENTERY/BOWEL: No obstruction, ascites or intraperitoneal   free air.    BONES/SOFT TISSUES: Acute fractures of the left posterior 10th and 11th   ribs. Chronic compression deformity of L2 vertebral body. Chronic   fractures of the bilateral inferior pubic rami (series 4, image 465).      IMPRESSION:   1. Acute fractures of the left posterior 10th and 11th ribs; no   pneumothorax.  2. New, indeterminate 2.2 cm left suprarenal mass or lymph node. Consider   adrenal protocol MRI abdomen with and without IV contrast, following   discharge from hospital.    Abd/pelvis CT reviewed - no pelvic fx      MEDICATIONS  (STANDING):  acetaminophen  Suppository. 650 milliGRAM(s) Rectal every 4 hours  amLODIPine   Tablet 5 milliGRAM(s) Oral daily  escitalopram 5 milliGRAM(s) Oral daily  gabapentin 100 milliGRAM(s) Oral daily  heparin  Injectable 5000 Unit(s) SubCutaneous every 8 hours  levETIRAcetam  IVPB 500 milliGRAM(s) IV Intermittent every 12 hours  LORazepam     Tablet 1 milliGRAM(s) Oral daily  memantine 10 milliGRAM(s) Oral daily  morphine  - Injectable 2 milliGRAM(s) IV Push once  oxybutynin 10 milliGRAM(s) Oral daily  pantoprazole  Injectable 40 milliGRAM(s) IV Push daily    MEDICATIONS  (PRN):      PPS (Palliative Performance Scale): home - 50-60% now - 10%

## 2018-06-22 NOTE — PROGRESS NOTE ADULT - ASSESSMENT
ASSESSMENT:  78y Female s/p SAH, unwitness fall, unknown onset, unknown LOC, multiple fractures    PLAN:   Neurologic: baseline dementia; stage 4. Pain control with Tylenol. Neuro Checks Q1, started on keppra,   Repeat head CT in the AM...[place] J collar for c-2 fx.  Per neurosurgery: no surgical intervention  Respiratory: on 2L NC...wean off as tolerated. Encourage IS  Cardiovascular: keep normotensive  Gastrointestinal/Nutrition: NPO. PPI. peech & Swallow consult pending   Renal/Genitourinary:  marquez in, monitor I/O's   Hematologic: trend H/H.....f/u 1600 labs  Infectious Disease: no abx   Endocrine: none  Disposition: admit to ICU ASSESSMENT:  78y Female s/p SAH, unwitness fall, unknown onset, unknown LOC, multiple fractures    PLAN:   Neurologic: baseline dementia; stage 4. Pain control with Tylenol. Neuro Checks Q1, started on keppra,   Repeat head CT in the AM...[place] J collar for c-2 fx.  Per neurosurgery: no surgical intervention  Respiratory: on 2L NC...wean off as tolerated. Encourage IS  Cardiovascular: keep normotensive  Gastrointestinal/Nutrition: NPO. PPI. peech & Swallow consult pending   Renal/Genitourinary:  marquez in, monitor I/O's   Hematologic: trend H/H, last Hgb 9.2  Infectious Disease: no abx   Endocrine: none  Disposition: admit to ICU ASSESSMENT:  78y Female s/p SAH, unwitness fall, unknown onset, unknown LOC, multiple fractures    PLAN:   Neurologic: baseline dementia; stage 4. Pain control with Tylenol. Neuro Checks Q1, started on keppra,   Repeat head CT in the AM...[place] J collar for c-2 fx.  Per neurosurgery: no surgical intervention  Respiratory: on 2L NC...wean off as tolerated. Encourage IS  Cardiovascular: keep normotensive  Gastrointestinal/Nutrition: NPO. PPI. peech & Swallow consult pending   Renal/Genitourinary:  marquez in, monitor I/O's   Hematologic: trend H/H, last Hgb 9.2  Infectious Disease: no abx   Endocrine: none  Disposition: downgrade to floor

## 2018-06-22 NOTE — SWALLOW BEDSIDE ASSESSMENT ADULT - SLP GENERAL OBSERVATIONS
pt received in bed asleep +arousable w/ c/o generalized pain. RN aware. +hard collar in place. +room air. daughter at b/s. pt received in bed asleep +arousable w/ c/o generalized pain. RN aware. +hard collar in place. pt responding to some questions, oriented x 1st name only. +room air. daughter at b/s.

## 2018-06-22 NOTE — CHART NOTE - NSCHARTNOTEFT_GEN_A_CORE
Jonah Cooper  77 y/o female s/p unwitnessed fall, obtained multiple injuries, including small L SAH, C2 vertebral body fx w/ prevertebral hematoma, L posterior 10-11th rib fx, b/l mandibular body fx, L distal radial fx, l 5th metacarpal fx, Lt thigh hematoma, incidental finding of 2cm L suprarenal mass.     Neuro: baseline Dementia stage 4, now more awake, restarted home meds. Keppra, pain control with tylenol, [place] J collar as per NSX  Repeat CT head was stable  Resp: on RA, incentive spirometry  CVS: is hypertensive, started Norvasc 5 mg daily  GI: passed S&S, nectar thick with 1:1 feeds, protonix  : marquez d/c'd, tov by 5pm  MSK: Lt radial fx was reduced, in cast  HSQ started.     Full sign out given to primary team

## 2018-06-22 NOTE — CONSULT NOTE ADULT - ASSESSMENT
78 F with PMH leukemia on gleevac, stage 4 dementia, s/p fall sustained b/l mandible fx, SAH, c-spine fx.      Recommendations-  No acute surgical intervention at this time per OMFS  Full liquid diet x 6 weeks  10 days antibiotics- Unasyn as inpatient, amoxicillin if discharged  Pain management  Chlorhexidine oral rinses bid  Follow up in OMFS clinic on Wed, July 11th, @1:00pm as outpatient or escort to clinic if inpatient.  Reconsult OMFS prn, page dental on call resident with any questions/concerns.

## 2018-06-22 NOTE — PROGRESS NOTE ADULT - ATTENDING COMMENTS
Agree with plan above.  Follow up head CT shows minimal scattered TSAH overall improved compared to CT #1.  No mass effect or midline shift.  CT C spine shows just a small anterior inferior chip fracture of C2 which is stable and will heal with just C-collar rx.  OK for SQ heparin and PT/rehab with collar in place.  No further keppra required.  Follow up with Dr. Sharma as o/p in 8 weeks with CT C spine.

## 2018-06-22 NOTE — PROGRESS NOTE ADULT - SUBJECTIVE AND OBJECTIVE BOX
Subjective: 78yFemale with a pmhx of SUBARACHNOID BLEED; FRACTURE OF CERVICAL SPINE WITHOUT SPINAL CORD LEASION  ^SUBARACHNOID BLEED; FRACTURE OF CERVICAL SPINE WITHOUT SPINAL CORD LEASION  No pertinent family history in first degree relatives  Handoff  MEWS Score  Dementia without behavioral disturbance, unspecified dementia type  Subarachnoid bleed  No significant past surgical history  90+  Rib fracture  Fracture of cervical spine without spinal cord lesion    s/p found down by aide. Confused at baseline, found to have scattered SAH and C2 fracture. Pt seen and examined at bedside. Pt arousable, appears to be comfortable but not complaining of any pain at this time. She grimaces when moved.       MEDICATIONS  (STANDING):  acetaminophen  Suppository. 650 milliGRAM(s) Rectal every 4 hours  levETIRAcetam  IVPB 500 milliGRAM(s) IV Intermittent every 12 hours  pantoprazole  Injectable 40 milliGRAM(s) IV Push daily  sodium chloride 0.9%. 1000 milliLiter(s) (75 mL/Hr) IV Continuous <Continuous>    MEDICATIONS  (PRN):      I&O's Detail    21 Jun 2018 07:01  -  22 Jun 2018 07:00  --------------------------------------------------------  IN:    IV PiggyBack: 100 mL    Lactated Ringers IV Bolus: 2000 mL    PRBCs (Packed Red Blood Cells): 295 mL    sodium chloride 0.9%.: 1350 mL  Total IN: 3745 mL    OUT:    Indwelling Catheter - Urethral: 930 mL  Total OUT: 930 mL    Total NET: 2815 mL      22 Jun 2018 07:01  -  22 Jun 2018 10:55  --------------------------------------------------------  IN:    sodium chloride 0.9%.: 150 mL  Total IN: 150 mL    OUT:    Indwelling Catheter - Urethral: 100 mL  Total OUT: 100 mL    Total NET: 50 mL        T(C): 37.3 (06-22-18 @ 07:45), Max: 37.4 (06-21-18 @ 11:15)  HR: 78 (06-22-18 @ 10:30) (70 - 100)  BP: 151/67 (06-22-18 @ 10:30) (127/67 - 187/87)  RR: 15 (06-22-18 @ 10:30) (11 - 21)  SpO2: 97% (06-22-18 @ 10:30) (92% - 99%)      Exam:  arousable to painful stimuli, states her name  EDWIN  followed commands to move right arm, left arm with fracture  moves b/l lower ext  unable to answer if sensation intact      CBC Full  -  ( 21 Jun 2018 21:55 )  WBC Count : 9.13 K/uL  Hemoglobin : 9.2 g/dL  Hematocrit : 26.0 %  Platelet Count - Automated : 130 K/uL  Mean Cell Volume : 94.9 fL  Mean Cell Hemoglobin : 33.6 pg  Mean Cell Hemoglobin Concentration : 35.4 g/dL      06-21    139  |  103  |  24<H>  ----------------------------<  134<H>  4.4   |  24  |  1.4    Ca    8.5      21 Jun 2018 21:55  Phos  3.9     06-21  Mg     1.7     06-21    TPro  6.0  /  Alb  4.0  /  TBili  0.5  /  DBili  x   /  AST  57<H>  /  ALT  32  /  AlkPhos  67  06-21    PT/INR - ( 21 Jun 2018 06:23 )   PT: 12.30 sec;   INR: 1.14 ratio         PTT - ( 21 Jun 2018 06:23 )  PTT:28.1 sec      Imaging:  < from: CT Head No Cont (06.21.18 @ 08:36) >  IMPRESSION:     1.  Small scattered left-sided subarachnoid hemorrhage. No significant   mass effect.    2.  Stable moderate chronic microvascular changes and cerebral atrophy.    Spoke with RASHAWN BARRIOS on 6/21/2018 9:03 AM with readback.    < end of copied text >      < from: Xray Chest 1 View- PORTABLE-Routine (06.22.18 @ 04:14) >  Impression:      No radiographic evidence of acute cardiopulmonary disease.    Unchanged.    < end of copied text >    Assessment/Plan: as above  ok to start hep sub Q  hard collar for 6-8 weeks  will need repeat head ct 3 weeks  should follow with Dr. Grangerfor oncology work up  follow up as outpatient- 8 weeks, will need cervical xrays at that time  D/w attending

## 2018-06-22 NOTE — CONSULT NOTE ADULT - ASSESSMENT
mandible fx; C2 fracture; l left distal radius fracture s/p reduced with splint , left base of 5th metacarpal fracture; lt 10th 11th ribs fractured; new suprarenal mass? lymph node?

## 2018-06-22 NOTE — CONSULT NOTE ADULT - ATTENDING COMMENTS
Pt seen and examined.  Agree with resident exam and plan.    fracture reduction and splint.  f/u post reduction films.  IF adequate patient will follow up as outpatient with orthopedics at [address] [place] [address].  [phone].
The patient was seen and examined in the emergency room at bedside.  Hard cervical collar is in place.  Patient's is sleepy but does grimace and open eyes to voice.  Moves all extremities spontaneously and withdraws to painful stimuli.  Pupils are equal and reactive.  Bloody urine drainage noted from [place] bag.    CT of the cervical spine was reviewed.  There is an anterior inferior fracture of the vertebral body of C2.  There is very minimal displacement.  Patient maintains physiologic sagittal alignment.    CT of the head was also reviewed.  There is scattered traumatic subarachnoid hemorrhage noted within the left hemisphere, cortical in nature.    RECOMMEND:  Repeat CT of the head within 24 hours.   This is secondary to the patient's baseline dementia and inability to obtain a neurological examination that is consistent.  Maintain patient hard cervical collar, if possible.  No urgent neurosurgical intervention indicated for cervical fracture present time.
Pt seen, multiple injuries secondary to trauma noted. Niece at bedside.  Reports pt's children are overwhelmed.  Wish to continue current level of care with mainentance of full code status.    Plan for family meeting when children available.   Overall poor prognosis  We will follow  Recs as above  Call [phone] PRN

## 2018-06-22 NOTE — SWALLOW BEDSIDE ASSESSMENT ADULT - SLP PERTINENT HISTORY OF CURRENT PROBLEM
pt admitted from home s/p possible fall w/ unknown onset+unknown LOC. patient was taken to ED for evaluation of signs of trauma, left face hematoma, left shoulder pain, left wrist pain and left thigh hematoma. Head CT showed Small scattered left-sided subarachnoid hemorrhage but no significant mass effect. CT of cervical spine showed minimally displaced avulsion fracture of the anterior-inferior C2, vertebral body with mild widening of the anterior C2-C3 disc space, consistent with extension teardrop fracture. pt admitted from home s/p possible fall w/ unknown onset+unknown LOC. patient was taken to ED for evaluation of signs of trauma, left face hematoma, left shoulder pain, left wrist pain and left thigh hematoma. Head CT showed Small scattered left-sided subarachnoid hemorrhage but no significant mass effect. CT of cervical spine showed minimally displaced avulsion fracture of the anterior-inferior C2, vertebral body with mild widening of the anterior C2-C3 disc space, consistent with extension teardrop fracture. CT maxillo-facial-> B/L mandibular fx.

## 2018-06-22 NOTE — PROVIDER CONTACT NOTE (CHANGE IN STATUS NOTIFICATION) - SITUATION
pt noted to have high blood pressure 181/79 hr 90 no order for b/p medicine, also RN checked bladder scan result is 141.

## 2018-06-22 NOTE — CONSULT NOTE ADULT - SUBJECTIVE AND OBJECTIVE BOX
78 F with PMH leukemia on gleevac, stage 4 dementia, s/p fall sustained b/l mandible fx, SAH, c-spine fx.  OMFS was consulted for management of mandible fx.    Exam limited as patient was minimally cooperative.  IO- Maxillary anterior teeth fractured down to height of gingiva. Tongue FROM. Occlusion stable/reproducible consistent with patient baseline.   EO- Minimal edema adj to mandible fx.     Imaging- reviewed  Labs- reviewed

## 2018-06-23 LAB
ANION GAP SERPL CALC-SCNC: 12 MMOL/L — SIGNIFICANT CHANGE UP (ref 7–14)
ANION GAP SERPL CALC-SCNC: 16 MMOL/L — HIGH (ref 7–14)
BUN SERPL-MCNC: 16 MG/DL — SIGNIFICANT CHANGE UP (ref 10–20)
BUN SERPL-MCNC: 18 MG/DL — SIGNIFICANT CHANGE UP (ref 10–20)
CALCIUM SERPL-MCNC: 8.6 MG/DL — SIGNIFICANT CHANGE UP (ref 8.5–10.1)
CALCIUM SERPL-MCNC: 8.6 MG/DL — SIGNIFICANT CHANGE UP (ref 8.5–10.1)
CHLORIDE SERPL-SCNC: 103 MMOL/L — SIGNIFICANT CHANGE UP (ref 98–110)
CHLORIDE SERPL-SCNC: 104 MMOL/L — SIGNIFICANT CHANGE UP (ref 98–110)
CK SERPL-CCNC: 394 U/L — HIGH (ref 0–225)
CO2 SERPL-SCNC: 23 MMOL/L — SIGNIFICANT CHANGE UP (ref 17–32)
CO2 SERPL-SCNC: 24 MMOL/L — SIGNIFICANT CHANGE UP (ref 17–32)
CREAT SERPL-MCNC: 1.2 MG/DL — SIGNIFICANT CHANGE UP (ref 0.7–1.5)
CREAT SERPL-MCNC: 1.2 MG/DL — SIGNIFICANT CHANGE UP (ref 0.7–1.5)
GLUCOSE SERPL-MCNC: 163 MG/DL — HIGH (ref 70–99)
GLUCOSE SERPL-MCNC: 173 MG/DL — HIGH (ref 70–99)
HCT VFR BLD CALC: 22.6 % — LOW (ref 37–47)
HGB BLD-MCNC: 7.8 G/DL — LOW (ref 12–16)
MAGNESIUM SERPL-MCNC: 1.8 MG/DL — SIGNIFICANT CHANGE UP (ref 1.8–2.4)
MAGNESIUM SERPL-MCNC: 1.8 MG/DL — SIGNIFICANT CHANGE UP (ref 1.8–2.4)
MCHC RBC-ENTMCNC: 32.9 PG — HIGH (ref 27–31)
MCHC RBC-ENTMCNC: 34.5 G/DL — SIGNIFICANT CHANGE UP (ref 32–37)
MCV RBC AUTO: 95.4 FL — SIGNIFICANT CHANGE UP (ref 81–99)
NRBC # BLD: 0 /100 WBCS — SIGNIFICANT CHANGE UP (ref 0–0)
PHOSPHATE SERPL-MCNC: 1.7 MG/DL — LOW (ref 2.1–4.9)
PHOSPHATE SERPL-MCNC: 2.6 MG/DL — SIGNIFICANT CHANGE UP (ref 2.1–4.9)
PLATELET # BLD AUTO: 104 K/UL — LOW (ref 130–400)
POTASSIUM SERPL-MCNC: 3.7 MMOL/L — SIGNIFICANT CHANGE UP (ref 3.5–5)
POTASSIUM SERPL-MCNC: 4.1 MMOL/L — SIGNIFICANT CHANGE UP (ref 3.5–5)
POTASSIUM SERPL-SCNC: 3.7 MMOL/L — SIGNIFICANT CHANGE UP (ref 3.5–5)
POTASSIUM SERPL-SCNC: 4.1 MMOL/L — SIGNIFICANT CHANGE UP (ref 3.5–5)
RBC # BLD: 2.37 M/UL — LOW (ref 4.2–5.4)
RBC # FLD: 15.4 % — HIGH (ref 11.5–14.5)
SODIUM SERPL-SCNC: 139 MMOL/L — SIGNIFICANT CHANGE UP (ref 135–146)
SODIUM SERPL-SCNC: 143 MMOL/L — SIGNIFICANT CHANGE UP (ref 135–146)
TROPONIN T SERPL-MCNC: 0.01 NG/ML — SIGNIFICANT CHANGE UP
WBC # BLD: 8.38 K/UL — SIGNIFICANT CHANGE UP (ref 4.8–10.8)
WBC # FLD AUTO: 8.38 K/UL — SIGNIFICANT CHANGE UP (ref 4.8–10.8)

## 2018-06-23 PROCEDURE — 99233 SBSQ HOSP IP/OBS HIGH 50: CPT

## 2018-06-23 RX ORDER — ACETAMINOPHEN 500 MG
650 TABLET ORAL EVERY 6 HOURS
Qty: 0 | Refills: 0 | Status: DISCONTINUED | OUTPATIENT
Start: 2018-06-23 | End: 2018-07-02

## 2018-06-23 RX ORDER — POTASSIUM CHLORIDE 20 MEQ
20 PACKET (EA) ORAL ONCE
Qty: 0 | Refills: 0 | Status: COMPLETED | OUTPATIENT
Start: 2018-06-23 | End: 2018-06-23

## 2018-06-23 RX ORDER — IMATINIB MESYLATE 400 MG/1
400 TABLET, FILM COATED ORAL DAILY
Qty: 0 | Refills: 0 | Status: DISCONTINUED | OUTPATIENT
Start: 2018-06-23 | End: 2018-06-29

## 2018-06-23 RX ORDER — MORPHINE SULFATE 50 MG/1
2 CAPSULE, EXTENDED RELEASE ORAL ONCE
Qty: 0 | Refills: 0 | Status: DISCONTINUED | OUTPATIENT
Start: 2018-06-23 | End: 2018-06-23

## 2018-06-23 RX ORDER — MAGNESIUM SULFATE 500 MG/ML
1 VIAL (ML) INJECTION ONCE
Qty: 0 | Refills: 0 | Status: COMPLETED | OUTPATIENT
Start: 2018-06-23 | End: 2018-06-23

## 2018-06-23 RX ORDER — OXYCODONE AND ACETAMINOPHEN 5; 325 MG/1; MG/1
2 TABLET ORAL ONCE
Qty: 0 | Refills: 0 | Status: DISCONTINUED | OUTPATIENT
Start: 2018-06-23 | End: 2018-06-23

## 2018-06-23 RX ORDER — SODIUM CHLORIDE 9 MG/ML
500 INJECTION INTRAMUSCULAR; INTRAVENOUS; SUBCUTANEOUS ONCE
Qty: 0 | Refills: 0 | Status: COMPLETED | OUTPATIENT
Start: 2018-06-23 | End: 2018-06-23

## 2018-06-23 RX ADMIN — LEVETIRACETAM 420 MILLIGRAM(S): 250 TABLET, FILM COATED ORAL at 01:52

## 2018-06-23 RX ADMIN — MEMANTINE HYDROCHLORIDE 10 MILLIGRAM(S): 10 TABLET ORAL at 12:37

## 2018-06-23 RX ADMIN — HEPARIN SODIUM 5000 UNIT(S): 5000 INJECTION INTRAVENOUS; SUBCUTANEOUS at 22:09

## 2018-06-23 RX ADMIN — Medication 50 GRAM(S): at 05:10

## 2018-06-23 RX ADMIN — Medication 650 MILLIGRAM(S): at 12:36

## 2018-06-23 RX ADMIN — MORPHINE SULFATE 2 MILLIGRAM(S): 50 CAPSULE, EXTENDED RELEASE ORAL at 18:10

## 2018-06-23 RX ADMIN — GABAPENTIN 100 MILLIGRAM(S): 400 CAPSULE ORAL at 12:36

## 2018-06-23 RX ADMIN — Medication 650 MILLIGRAM(S): at 12:38

## 2018-06-23 RX ADMIN — Medication 50 MILLIEQUIVALENT(S): at 08:08

## 2018-06-23 RX ADMIN — SODIUM CHLORIDE 666.67 MILLILITER(S): 9 INJECTION INTRAMUSCULAR; INTRAVENOUS; SUBCUTANEOUS at 23:17

## 2018-06-23 RX ADMIN — Medication 1 MILLIGRAM(S): at 12:43

## 2018-06-23 RX ADMIN — Medication 10 MILLIGRAM(S): at 12:36

## 2018-06-23 RX ADMIN — Medication 650 MILLIGRAM(S): at 01:52

## 2018-06-23 RX ADMIN — PANTOPRAZOLE SODIUM 40 MILLIGRAM(S): 20 TABLET, DELAYED RELEASE ORAL at 12:39

## 2018-06-23 RX ADMIN — MORPHINE SULFATE 2 MILLIGRAM(S): 50 CAPSULE, EXTENDED RELEASE ORAL at 18:09

## 2018-06-23 RX ADMIN — Medication 650 MILLIGRAM(S): at 05:08

## 2018-06-23 RX ADMIN — HEPARIN SODIUM 5000 UNIT(S): 5000 INJECTION INTRAVENOUS; SUBCUTANEOUS at 05:12

## 2018-06-23 RX ADMIN — Medication 650 MILLIGRAM(S): at 05:38

## 2018-06-23 RX ADMIN — Medication 650 MILLIGRAM(S): at 17:39

## 2018-06-23 RX ADMIN — HEPARIN SODIUM 5000 UNIT(S): 5000 INJECTION INTRAVENOUS; SUBCUTANEOUS at 15:33

## 2018-06-23 RX ADMIN — OXYCODONE AND ACETAMINOPHEN 2 TABLET(S): 5; 325 TABLET ORAL at 23:38

## 2018-06-23 RX ADMIN — Medication 650 MILLIGRAM(S): at 02:30

## 2018-06-23 RX ADMIN — AMLODIPINE BESYLATE 5 MILLIGRAM(S): 2.5 TABLET ORAL at 05:09

## 2018-06-23 RX ADMIN — Medication 650 MILLIGRAM(S): at 17:43

## 2018-06-23 RX ADMIN — LEVETIRACETAM 420 MILLIGRAM(S): 250 TABLET, FILM COATED ORAL at 12:48

## 2018-06-23 RX ADMIN — ESCITALOPRAM OXALATE 5 MILLIGRAM(S): 10 TABLET, FILM COATED ORAL at 15:33

## 2018-06-23 NOTE — CONSULT NOTE ADULT - ASSESSMENT
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

## 2018-06-23 NOTE — INPATIENT CERTIFICATION FOR MEDICARE PATIENTS - RISKS OF ADVERSE EVENTS
Concern for delay in diagnosis and treatment/Concern for neurologic deterioration/Concern for cardiopulmonary deterioration/Concern for worsening infectious process

## 2018-06-23 NOTE — PROGRESS NOTE ADULT - ASSESSMENT
Assessment:  78y Female patient  Presented S/P unwitnessed fall. Left 10-11 rib frx, C2 avulsion frx, BL mandibular body frx, Small L SAH L thigh hematoma, L wrist frx. Was DG from ICU to floor, seen by MOFS, ortho, NSX, passed S+S evaluation on soft diet, [place] J in place, Rpt CTH was stable, with the above physical exam, labs, and imaging findings.    Plan:  OMFS: no acute surgical intervention, Full liquid diet x6 weeks, 10 days Unasyn, oral CHD rinses, OMFS f/u in clinic 7/11/18  Ortho: THU CHAMBERS, splinted  NSX: start HSQ, Hard collar 6-8 weeks, rpt HCT 3 weeks, Dr. Granger for Onc HAHN, Out paient FU in 8 weeks for cervical XR  S+S: Nectar thick pureed diet  Dispo:  Seen and evaluated by PT: Yes [  ] No [ X ] Ordered  Physiatry reccomendations: Pending...   SW: Dispo pending    Date/Time: 06-23-18 @ 02:36

## 2018-06-23 NOTE — CONSULT NOTE ADULT - SUBJECTIVE AND OBJECTIVE BOX
KYM ROTH  78y  Female      77y/o F with PMHx of advanced Dementia, presents for evaluation of possible fall, unknown onset and unknown LOC. The patient lives at home with daughter and home aide, last night she went to bed and was later heard crying for help next to her bed. The patient was taken to ED for evaluation. signs of trauma, left face hematoma, lt shoulder pain, lt wrist pain. lt thigh hematoma (21 Jun 2018 12:38)    S: pt nonverbal due to advanced dementia. Daughter at bedise says that pt is doing ok        acetaminophen   Tablet. 650 milliGRAM(s) Oral every 6 hours  acetaminophen  Suppository. 650 milliGRAM(s) Rectal every 4 hours  amLODIPine   Tablet 5 milliGRAM(s) Oral daily  escitalopram 5 milliGRAM(s) Oral daily  gabapentin 100 milliGRAM(s) Oral daily  heparin  Injectable 5000 Unit(s) SubCutaneous every 8 hours  imatinib 400 milliGRAM(s) Oral daily  levETIRAcetam  IVPB 500 milliGRAM(s) IV Intermittent every 12 hours  LORazepam     Tablet 1 milliGRAM(s) Oral daily  memantine 10 milliGRAM(s) Oral daily  oxybutynin 10 milliGRAM(s) Oral daily  pantoprazole  Injectable 40 milliGRAM(s) IV Push daily    No Known Allergies    PMH/PSH:  HTN  Dementia    ALLERGIES:      No Known Allergies    Intolerances      SOCIAL HABITS: denies smoking alcohol drugs    FAMILY HISTORY:   FAMILY HISTORY:  No pertinent family history in first degree relatives    HOME MEDICATIONS:  Home Medications:  ESCITALOPRAM TAB 5MG:  (21 Jun 2018 13:04)  ESOMEPRA MAG CAP 20MG DR:  (21 Jun 2018 13:04)  GABAPENTIN   CAP 100MG:  (21 Jun 2018 13:04)  GLEEVEC      TAB 400MG:  (21 Jun 2018 13:04)  LORAZEPAM    TAB 1MG:  (21 Jun 2018 13:04)  MEMANTINE    TAB HCL 10MG:  (21 Jun 2018 13:04)  OXYBUTYNIN   TAB 10MG ER:  (21 Jun 2018 13:04)  POT CL MICRO TAB 20MEQ ER:  (21 Jun 2018 13:04)      REVIEW OF SYSTEMS:  unable to assess due to dementia    T(C): 36.8 (06-23-18 @ 15:14), Max: 37.1 (06-23-18 @ 08:11)  HR: 118 (06-23-18 @ 16:48) (102 - 118)  BP: 149/78 (06-23-18 @ 16:48) (138/64 - 198/86)  RR: 18 (06-23-18 @ 16:48) (18 - 18)  SpO2: 97% (06-23-18 @ 16:48) (97% - 97%)  Wt(kg): --Vital Signs Last 24 Hrs  T(C): 36.8 (23 Jun 2018 15:14), Max: 37.1 (23 Jun 2018 08:11)  T(F): 98.3 (23 Jun 2018 15:14), Max: 98.7 (23 Jun 2018 08:11)  HR: 118 (23 Jun 2018 16:48) (102 - 118)  BP: 149/78 (23 Jun 2018 16:48) (138/64 - 198/86)  BP(mean): --  RR: 18 (23 Jun 2018 16:48) (18 - 18)  SpO2: 97% (23 Jun 2018 16:48) (97% - 97%)    PHYSICAL EXAM:  Gen: NAD, AA0x0, neck brace, +multiple hematomas  HEENT: PERRLA, EOM, no LAD  CV: nl S1 S2  Resp: decreased BS b/l  GI: NT/ND/S +BS  MS: no c/c/e  Neuro: not following commands, withdraws to pain all extr    Consultant(s) Notes Reviewed:  [x ] YES  [ ] NO  Care Discussed with Consultants/Other Providers [ x] YES  [ ] NO    LABS:  CBC   06-23-18 @ 00:42  Hematcorit 22.6  Hemoglobin 7.8  Mean Cell Hemoglobin 32.9  Platelet Count-Automated 104  RBC Count 2.37  Red Cell Distrib Width 15.4  Wbc Count 8.38      BMP  06-23-18 @ 00:42  Anion Gap. Serum 12  Blood Urea Nitrogen,Serm 16  Calcium, Total Serum 8.6  Carbon Dioxide, Serum 24  Chloride, Serum 103  Creatinine, Serum 1.2  eGFR in  50  eGFR in Non Afican American 43  Gloucose, serum 163  Potassium, Serum 3.7  Sodium, Serum 139              06-21-18 @ 21:55  Anion Gap. Serum 12  Blood Urea Nitrogen,Serm 24  Calcium, Total Serum 8.5  Carbon Dioxide, Serum 24  Chloride, Serum 103  Creatinine, Serum 1.4  eGFR in  41  eGFR in Non Afican American 36  Gloucose, serum 134  Potassium, Serum 4.4  Sodium, Serum 139              06-21-18 @ 06:23  Anion Gap. Serum 17  Blood Urea Nitrogen,Serm 22  Calcium, Total Serum 8.8  Carbon Dioxide, Serum 21  Chloride, Serum 99  Creatinine, Serum 1.6  eGFR in  35  eGFR in Non Afican American 30  Gloucose, serum 136  Potassium, Serum 4.2  Sodium, Serum 137                  CMP  06-23-18 @ 00:42  Mikki Aminotransferase(ALT/SGPT)--  Albumin, Serum --  Alkaline Phosphatase, Serum --  Anion Gap, Serum 12  Aspartate Aminotransferase (AST/SGOT)--  Bilirubin Total, Serum --  Blood Urea Nitrogen, Serum 16  Calcium,Total Serum 8.6  Carbon Dioxide, Serum 24  Chloride, Serum 103  Creatinine, Serum 1.2  eGFR if  50  eGFR if Non African American 43  Glucose, Serum 163  Potassium, Serum 3.7  Protein Total, Serum --  Sodium, Serum 139                      06-21-18 @ 21:55  Mikki Aminotransferase(ALT/SGPT)--  Albumin, Serum --  Alkaline Phosphatase, Serum --  Anion Gap, Serum 12  Aspartate Aminotransferase (AST/SGOT)--  Bilirubin Total, Serum --  Blood Urea Nitrogen, Serum 24  Calcium,Total Serum 8.5  Carbon Dioxide, Serum 24  Chloride, Serum 103  Creatinine, Serum 1.4  eGFR if  41  eGFR if Non African American 36  Glucose, Serum 134  Potassium, Serum 4.4  Protein Total, Serum --  Sodium, Serum 139                      06-21-18 @ 06:23  Mikki Aminotransferase(ALT/SGPT)32  Albumin, Serum 4.0  Alkaline Phosphatase, Serum 67  Anion Gap, Serum 17  Aspartate Aminotransferase (AST/SGOT)57  Bilirubin Total, Serum 0.5  Blood Urea Nitrogen, Serum 22  Calcium,Total Serum 8.8  Carbon Dioxide, Serum 21  Chloride, Serum 99  Creatinine, Serum 1.6  eGFR if  35  eGFR if Non African American 30  Glucose, Serum 136  Potassium, Serum 4.2  Protein Total, Serum 6.0  Sodium, Serum 137        PT/INR  PT/INR  06-21-18 @ 06:23  INR 1.14  Prothrombin Time Comment --  Prothrobin Time, [id number]      Amylase/Lipase  06-21-18 @ 06:23  Amylase, Serum Total --  Lipase, Serum 60            RADIOLOGY & ADDITIONAL TESTS:    Imaging Personally Reviewed:  [ x] YES  [ ] NO

## 2018-06-23 NOTE — PROGRESS NOTE ADULT - SUBJECTIVE AND OBJECTIVE BOX
Progress Note: Trauma Surgery  Patient: KYM ROTH , 78y (1940)Female   MRN: [id number]  Location: [address]  Visit: 06-21-18 Inpatient  Date: 06-23-18 @ 02:36  Hospital Day: 3    Procedure/Injury: Presented S/P unwitnessed fall. Left 10-11 rib frx, C2 avulsion frx, BL mandibular body frx, Small L SAH L thigh hematoma, L wrist frx  Events over 24h: DG from ICU to floor, seen by MOFS, ortho, NSX, passed S+S evaluation on soft diet, [place] J in place, Rpt CTH was stable  Bowel function: Bowel movement [  ] Flatus [  ]    Vitals: T(F): 98.4 (06-22-18 @ 23:48), Max: 99.2 (06-22-18 @ 05:30)  HR: 102 (06-22-18 @ 23:48)  BP: 138/64 (06-22-18 @ 23:48) (137/61 - 197/89)  RR: 18 (06-22-18 @ 23:48)  SpO2: 97% (06-22-18 @ 13:00)    In:   06-22-18 @ 07:01  -  06-23-18 @ 02:36  --------------------------------------------------------  IN: 400 mL    Out:   06-22-18 @ 07:01  -  06-23-18 @ 02:36  --------------------------------------------------------  OUT:    Indwelling Catheter - Urethral: 275 mL  Total OUT: 275 mL    Net:   06-22-18 @ 07:01  -  06-23-18 @ 02:36  --------------------------------------------------------  NET: 125 mL    Diet: Diet, Dysphagia 1 Pureed-Nectar Consistency Fluid (06-22-18 @ 11:07)    Physical Examination:  General: NAD, AAOx1, GCS 14/15  HEENT: [place] J collar in place  Heart: S1 S2, No MRG RRR   Lungs: CTABL +BS Equal BL, No WRC  Abdomen:  +BS. SNDNT. Obese.   MSK/Extremities: ROM intact BL UE/LE, LUE in splint     Medications: [Standing]  acetaminophen  Suppository. 650 milliGRAM(s) Rectal every 4 hours  amLODIPine   Tablet 5 milliGRAM(s) Oral daily  escitalopram 5 milliGRAM(s) Oral daily  gabapentin 100 milliGRAM(s) Oral daily  heparin  Injectable 5000 Unit(s) SubCutaneous every 8 hours  levETIRAcetam  IVPB 500 milliGRAM(s) IV Intermittent every 12 hours  LORazepam     Tablet 1 milliGRAM(s) Oral daily  memantine 10 milliGRAM(s) Oral daily  oxybutynin 10 milliGRAM(s) Oral daily  pantoprazole  Injectable 40 milliGRAM(s) IV Push daily    Medications:[PRN]  morphine Concentrate 5 milliGRAM(s) Oral every 6 hours PRN    Labs:                        9.2    9.13  )-----------( 130      ( 21 Jun 2018 21:55 )             26.0     06-21    139  |  103  |  24<H>  ----------------------------<  134<H>  4.4   |  24  |  1.4    Ca    8.5      21 Jun 2018 21:55  Phos  3.9     06-21  Mg     1.7     06-21    TPro  6.0  /  Alb  4.0  /  TBili  0.5  /  DBili  x   /  AST  57<H>  /  ALT  32  /  AlkPhos  67  06-21    LIVER FUNCTIONS - ( 21 Jun 2018 06:23 )  Alb: 4.0 g/dL / Pro: 6.0 g/dL / ALK PHOS: 67 U/L / ALT: 32 U/L / AST: 57 U/L / GGT: x           PT/INR - ( 21 Jun 2018 06:23 )   PT: 12.30 sec;   INR: 1.14 ratio    PTT - ( 21 Jun 2018 06:23 )  PTT:28.1 sec    CARDIAC MARKERS ( 22 Jun 2018 17:41 )  x     / <0.01 ng/mL / 805 U/L / x     / x      CARDIAC MARKERS ( 21 Jun 2018 21:55 )  x     / <0.01 ng/mL / x     / x     / x        Imaging:  < from: CT Head No Cont (06.22.18 @ 08:38) >  IMPRESSION:   1.  Stable scattered left-sided subarachnoid hemorrhage when compared to   prior study dated 6/21/2018.  2.  Periventricular and subcortical white matter chronic small vessel   ischemic changes.  3.  No acute mass effect, midline shift or new hemorrhages.    4.  Follow-up as clinically indicated.  < end of copied text >

## 2018-06-24 LAB
ANION GAP SERPL CALC-SCNC: 12 MMOL/L — SIGNIFICANT CHANGE UP (ref 7–14)
BASOPHILS # BLD AUTO: 0 K/UL — SIGNIFICANT CHANGE UP (ref 0–0.2)
BASOPHILS NFR BLD AUTO: 0 % — SIGNIFICANT CHANGE UP (ref 0–1)
BUN SERPL-MCNC: 19 MG/DL — SIGNIFICANT CHANGE UP (ref 10–20)
CALCIUM SERPL-MCNC: 8.4 MG/DL — LOW (ref 8.5–10.1)
CHLORIDE SERPL-SCNC: 105 MMOL/L — SIGNIFICANT CHANGE UP (ref 98–110)
CO2 SERPL-SCNC: 24 MMOL/L — SIGNIFICANT CHANGE UP (ref 17–32)
CREAT SERPL-MCNC: 1.1 MG/DL — SIGNIFICANT CHANGE UP (ref 0.7–1.5)
D DIMER BLD IA.RAPID-MCNC: SIGNIFICANT CHANGE UP NG/ML DDU (ref 0–230)
EOSINOPHIL # BLD AUTO: 0 K/UL — SIGNIFICANT CHANGE UP (ref 0–0.7)
EOSINOPHIL NFR BLD AUTO: 0 % — SIGNIFICANT CHANGE UP (ref 0–8)
GIANT PLATELETS BLD QL SMEAR: PRESENT — SIGNIFICANT CHANGE UP
GLUCOSE SERPL-MCNC: 181 MG/DL — HIGH (ref 70–99)
HCT VFR BLD CALC: 19.4 % — LOW (ref 37–47)
HCT VFR BLD CALC: 19.4 % — LOW (ref 37–47)
HCT VFR BLD CALC: 26.6 % — LOW (ref 37–47)
HGB BLD-MCNC: 6.6 G/DL — CRITICAL LOW (ref 12–16)
HGB BLD-MCNC: 6.6 G/DL — CRITICAL LOW (ref 12–16)
HGB BLD-MCNC: 9.2 G/DL — LOW (ref 12–16)
LYMPHOCYTES # BLD AUTO: 0.76 K/UL — LOW (ref 1.2–3.4)
LYMPHOCYTES # BLD AUTO: 7.8 % — LOW (ref 20.5–51.1)
MAGNESIUM SERPL-MCNC: 1.8 MG/DL — SIGNIFICANT CHANGE UP (ref 1.8–2.4)
MANUAL SMEAR VERIFICATION: SIGNIFICANT CHANGE UP
MCHC RBC-ENTMCNC: 31.5 PG — HIGH (ref 27–31)
MCHC RBC-ENTMCNC: 33 PG — HIGH (ref 27–31)
MCHC RBC-ENTMCNC: 33.2 PG — HIGH (ref 27–31)
MCHC RBC-ENTMCNC: 34 G/DL — SIGNIFICANT CHANGE UP (ref 32–37)
MCHC RBC-ENTMCNC: 34 G/DL — SIGNIFICANT CHANGE UP (ref 32–37)
MCHC RBC-ENTMCNC: 34.6 G/DL — SIGNIFICANT CHANGE UP (ref 32–37)
MCV RBC AUTO: 91.1 FL — SIGNIFICANT CHANGE UP (ref 81–99)
MCV RBC AUTO: 97 FL — SIGNIFICANT CHANGE UP (ref 81–99)
MCV RBC AUTO: 97.5 FL — SIGNIFICANT CHANGE UP (ref 81–99)
MONOCYTES # BLD AUTO: 0.17 K/UL — SIGNIFICANT CHANGE UP (ref 0.1–0.6)
MONOCYTES NFR BLD AUTO: 1.8 % — SIGNIFICANT CHANGE UP (ref 1.7–9.3)
NEUTROPHILS # BLD AUTO: 8.77 K/UL — HIGH (ref 1.4–6.5)
NEUTROPHILS NFR BLD AUTO: 85.2 % — HIGH (ref 42.2–75.2)
NEUTS BAND # BLD: 5.2 % — SIGNIFICANT CHANGE UP (ref 0–6)
NRBC # BLD: 0 /100 WBCS — SIGNIFICANT CHANGE UP (ref 0–0)
PHOSPHATE SERPL-MCNC: 1.7 MG/DL — LOW (ref 2.1–4.9)
PLAT MORPH BLD: NORMAL — SIGNIFICANT CHANGE UP
PLATELET # BLD AUTO: 101 K/UL — LOW (ref 130–400)
PLATELET # BLD AUTO: 105 K/UL — LOW (ref 130–400)
PLATELET # BLD AUTO: 91 K/UL — LOW (ref 130–400)
POTASSIUM SERPL-MCNC: 3.6 MMOL/L — SIGNIFICANT CHANGE UP (ref 3.5–5)
POTASSIUM SERPL-SCNC: 3.6 MMOL/L — SIGNIFICANT CHANGE UP (ref 3.5–5)
RBC # BLD: 1.99 M/UL — LOW (ref 4.2–5.4)
RBC # BLD: 2 M/UL — LOW (ref 4.2–5.4)
RBC # BLD: 2.92 M/UL — LOW (ref 4.2–5.4)
RBC # FLD: 15 % — HIGH (ref 11.5–14.5)
RBC # FLD: 15.1 % — HIGH (ref 11.5–14.5)
RBC # FLD: 16.2 % — HIGH (ref 11.5–14.5)
RBC BLD AUTO: NORMAL — SIGNIFICANT CHANGE UP
SODIUM SERPL-SCNC: 141 MMOL/L — SIGNIFICANT CHANGE UP (ref 135–146)
TYPE + AB SCN PNL BLD: SIGNIFICANT CHANGE UP
WBC # BLD: 5.99 K/UL — SIGNIFICANT CHANGE UP (ref 4.8–10.8)
WBC # BLD: 6.09 K/UL — SIGNIFICANT CHANGE UP (ref 4.8–10.8)
WBC # BLD: 9.7 K/UL — SIGNIFICANT CHANGE UP (ref 4.8–10.8)
WBC # FLD AUTO: 5.99 K/UL — SIGNIFICANT CHANGE UP (ref 4.8–10.8)
WBC # FLD AUTO: 6.09 K/UL — SIGNIFICANT CHANGE UP (ref 4.8–10.8)
WBC # FLD AUTO: 9.7 K/UL — SIGNIFICANT CHANGE UP (ref 4.8–10.8)

## 2018-06-24 PROCEDURE — 99291 CRITICAL CARE FIRST HOUR: CPT

## 2018-06-24 RX ORDER — MORPHINE SULFATE 50 MG/1
5 CAPSULE, EXTENDED RELEASE ORAL EVERY 6 HOURS
Qty: 0 | Refills: 0 | Status: DISCONTINUED | OUTPATIENT
Start: 2018-06-24 | End: 2018-06-27

## 2018-06-24 RX ORDER — MAGNESIUM SULFATE 500 MG/ML
2 VIAL (ML) INJECTION ONCE
Qty: 0 | Refills: 0 | Status: COMPLETED | OUTPATIENT
Start: 2018-06-24 | End: 2018-06-24

## 2018-06-24 RX ORDER — IPRATROPIUM/ALBUTEROL SULFATE 18-103MCG
3 AEROSOL WITH ADAPTER (GRAM) INHALATION EVERY 6 HOURS
Qty: 0 | Refills: 0 | Status: DISCONTINUED | OUTPATIENT
Start: 2018-06-24 | End: 2018-06-29

## 2018-06-24 RX ORDER — LABETALOL HCL 100 MG
10 TABLET ORAL ONCE
Qty: 0 | Refills: 0 | Status: COMPLETED | OUTPATIENT
Start: 2018-06-24 | End: 2018-06-24

## 2018-06-24 RX ORDER — POTASSIUM PHOSPHATE, MONOBASIC POTASSIUM PHOSPHATE, DIBASIC 236; 224 MG/ML; MG/ML
30 INJECTION, SOLUTION INTRAVENOUS ONCE
Qty: 0 | Refills: 0 | Status: COMPLETED | OUTPATIENT
Start: 2018-06-24 | End: 2018-06-24

## 2018-06-24 RX ADMIN — MORPHINE SULFATE 5 MILLIGRAM(S): 50 CAPSULE, EXTENDED RELEASE ORAL at 17:30

## 2018-06-24 RX ADMIN — OXYCODONE AND ACETAMINOPHEN 2 TABLET(S): 5; 325 TABLET ORAL at 00:33

## 2018-06-24 RX ADMIN — GABAPENTIN 100 MILLIGRAM(S): 400 CAPSULE ORAL at 14:34

## 2018-06-24 RX ADMIN — ESCITALOPRAM OXALATE 5 MILLIGRAM(S): 10 TABLET, FILM COATED ORAL at 14:35

## 2018-06-24 RX ADMIN — LEVETIRACETAM 420 MILLIGRAM(S): 250 TABLET, FILM COATED ORAL at 01:09

## 2018-06-24 RX ADMIN — Medication 650 MILLIGRAM(S): at 10:37

## 2018-06-24 RX ADMIN — Medication 3 MILLILITER(S): at 20:03

## 2018-06-24 RX ADMIN — MORPHINE SULFATE 5 MILLIGRAM(S): 50 CAPSULE, EXTENDED RELEASE ORAL at 11:18

## 2018-06-24 RX ADMIN — Medication 10 MILLIGRAM(S): at 16:30

## 2018-06-24 RX ADMIN — PANTOPRAZOLE SODIUM 40 MILLIGRAM(S): 20 TABLET, DELAYED RELEASE ORAL at 14:34

## 2018-06-24 RX ADMIN — POTASSIUM PHOSPHATE, MONOBASIC POTASSIUM PHOSPHATE, DIBASIC 85 MILLIMOLE(S): 236; 224 INJECTION, SOLUTION INTRAVENOUS at 10:01

## 2018-06-24 RX ADMIN — Medication 10 MILLIGRAM(S): at 13:00

## 2018-06-24 RX ADMIN — HEPARIN SODIUM 5000 UNIT(S): 5000 INJECTION INTRAVENOUS; SUBCUTANEOUS at 21:03

## 2018-06-24 RX ADMIN — MORPHINE SULFATE 5 MILLIGRAM(S): 50 CAPSULE, EXTENDED RELEASE ORAL at 11:30

## 2018-06-24 RX ADMIN — Medication 650 MILLIGRAM(S): at 00:33

## 2018-06-24 RX ADMIN — Medication 10 MILLIGRAM(S): at 14:33

## 2018-06-24 RX ADMIN — HEPARIN SODIUM 5000 UNIT(S): 5000 INJECTION INTRAVENOUS; SUBCUTANEOUS at 14:45

## 2018-06-24 RX ADMIN — Medication 650 MILLIGRAM(S): at 16:22

## 2018-06-24 RX ADMIN — Medication 650 MILLIGRAM(S): at 11:53

## 2018-06-24 RX ADMIN — Medication 3 MILLILITER(S): at 13:22

## 2018-06-24 RX ADMIN — LEVETIRACETAM 420 MILLIGRAM(S): 250 TABLET, FILM COATED ORAL at 17:22

## 2018-06-24 RX ADMIN — MEMANTINE HYDROCHLORIDE 10 MILLIGRAM(S): 10 TABLET ORAL at 14:33

## 2018-06-24 RX ADMIN — MORPHINE SULFATE 5 MILLIGRAM(S): 50 CAPSULE, EXTENDED RELEASE ORAL at 17:22

## 2018-06-24 RX ADMIN — Medication 50 GRAM(S): at 10:01

## 2018-06-24 RX ADMIN — Medication 650 MILLIGRAM(S): at 16:50

## 2018-06-24 NOTE — PROGRESS NOTE ADULT - SUBJECTIVE AND OBJECTIVE BOX
ICU upgrade note:    KYM ROTH  [id number]  78y Female    Indication for ICU admission: s/p fall   Admit Date:06-21-18  ICU Date:  OR Date:     HPI 79y/o F with PMHx of Dementia stage 4, presents for evaluation of possible fall, unknown onset and unknown LOC. The patient lives at home with daughter and home aide, last night she went to bed and was later heard crying for help next to her bed. The patient was taken to ED for evaluation of signs of trauma, left face hematoma, left shoulder pain, left wrist pain and left thigh hematoma. Head CT showed Small scattered left-sided subarachnoid hemorrhage but no significant mass effect. CT of cervical spine showed minimally displaced avulsion fracture of the anterior-inferior C2, vertebral body with mild widening of the anterior C2-C3 disc space, consistent with extension teardrop fracture. CT Chest with IV contrast showed acute fractures of the left posterior 10th and 11th, new indeterminate 2.2 cm left suprarenal mass and no pneumothorax. CT maxillofacial showed acute nondisplaced fractures of the mandibular bodies bilaterally. The patient was transferred to ICU for continued care     No Known Allergies    PAST MEDICAL & SURGICAL HISTORY:  PMH: Dementia stage IV, Leukemia in remission, Kidney CA, HLD, previous pelvis fx, previous wrist fx  PSH: kidney sx, D+C  SH: lives at home with home health attendant; daughter lives downstairs  NKDA      Home Medications:  ESCITALOPRAM TAB 5MG:  (21 Jun 2018 13:04)  ESOMEPRA MAG CAP 20MG DR:  (21 Jun 2018 13:04)  GABAPENTIN   CAP 100MG:  (21 Jun 2018 13:04)  GLEEVEC      TAB 400MG:  (21 Jun 2018 13:04)  LORAZEPAM    TAB 1MG:  (21 Jun 2018 13:04)  MEMANTINE    TAB HCL 10MG:  (21 Jun 2018 13:04)  OXYBUTYNIN   TAB 10MG ER:  (21 Jun 2018 13:04)  POT CL MICRO TAB 20MEQ ER:  (21 Jun 2018 13:04)      24HRS EVENT:  Patient was weaned off morphine during the day. During the evening was found to be tachycardic despite adequate fluid balance, HR of 122 was reported, EKG and cardiac enzymes were sent and were found to be wnl. Senior resident was notified and plan was discussed. EKG showed sinus tachycardia with unspecified t wave abnormality. Gave fluid bolus and patient didn't respond. As patient was weaned off pain medication we gave two percocet to see if she responded, resulted in mild decrease in HR. Ordered d-dimer and send venous duplex. afternoon labs drop in hemoglobin. Repeat was sent and confirmed significant decrease. Patient was also found to have slow decrease in O2 saturation. Patient was sent for pan scan, type and screen was performed. 2 units of PRBC were order and ICU consult was placed. On the way back from ct scan patient vomited. Plan was made for ICU upgrade        Code status: Fullcode    DVT PTX: SCD    GI PTX: PPI    ***Tubes/Lines/Drains  ***  Peripheral IV      Urinary Catheter		Indication: Strict I&O    Date Placed: 6/21/18      REVIEW OF SYSTEMS    [ ] A ten-point review of systems was otherwise negative except as noted.  [ X] Due to altered mental status/intubation, subjective information were not able to be obtained from the patient. History was obtained, to the extent possible, from review of the chart and collateral sources of information.    Daily         Diet, Dysphagia 1 Pureed-Nectar Consistency Fluid (06-22-18 @ 11:07)      CURRENT MEDS:  Neurologic Medications  acetaminophen   Tablet. 650 milliGRAM(s) Oral every 6 hours  acetaminophen  Suppository. 650 milliGRAM(s) Rectal every 4 hours  escitalopram 5 milliGRAM(s) Oral daily  gabapentin 100 milliGRAM(s) Oral daily  levETIRAcetam  IVPB 500 milliGRAM(s) IV Intermittent every 12 hours  LORazepam     Tablet 1 milliGRAM(s) Oral daily  memantine 10 milliGRAM(s) Oral daily  morphine Concentrate 5 milliGRAM(s) Oral every 6 hours    Respiratory Medications    Cardiovascular Medications  amLODIPine   Tablet 5 milliGRAM(s) Oral daily    Gastrointestinal Medications  magnesium sulfate  IVPB 2 Gram(s) IV Intermittent once  pantoprazole  Injectable 40 milliGRAM(s) IV Push daily  potassium phosphate IVPB 30 milliMole(s) IV Intermittent once    Genitourinary Medications  oxybutynin 10 milliGRAM(s) Oral daily    Hematologic/Oncologic Medications  heparin  Injectable 5000 Unit(s) SubCutaneous every 8 hours  imatinib 400 milliGRAM(s) Oral daily    Antimicrobial/Immunologic Medications    Endocrine/Metabolic Medications    Topical/Other Medications      ICU Vital Signs Last 24 Hrs  T(C): 36.7 (24 Jun 2018 07:11), Max: 36.9 (24 Jun 2018 00:00)  T(F): 98 (24 Jun 2018 07:11), Max: 98.5 (24 Jun 2018 00:00)  HR: 113 (24 Jun 2018 07:11) (113 - 124)  BP: 137/63 (24 Jun 2018 07:11) (133/70 - 198/86)  RR: 18 (24 Jun 2018 07:11) (18 - 18)  SpO2: 97% (23 Jun 2018 16:48) (97% - 97%)          I&O's Summary    23 Jun 2018 07:01  -  24 Jun 2018 07:00  --------------------------------------------------------  IN: 400 mL / OUT: 0 mL / NET: 400 mL      I&O's Detail    23 Jun 2018 07:01  -  24 Jun 2018 07:00  --------------------------------------------------------  IN:    Oral Fluid: 400 mL  Total IN: 400 mL    OUT:  Total OUT: 0 mL    Total NET: 400 mL          PHYSICAL EXAM:    GENERAL: NAD - dementia    HEENT: NCAT, c collar in place    CHEST/LUNGS: CTAB    HEART: tachycardia    ABDOMEN: NT, ND +BS    EXTREMITIES:  left arm in splint     NEURO: No focal neurological deficits    SKIN: No rashes or lesions    CXR:     LABS:  CAPILLARY BLOOD GLUCOSE                              6.6    5.99  )-----------( 105      ( 24 Jun 2018 03:27 )             19.4       06-24    141  |  105  |  19  ----------------------------<  181<H>  3.6   |  24  |  1.1    Ca    8.4<L>      24 Jun 2018 01:00  Phos  1.7     06-24  Mg     1.8     06-24          CARDIAC MARKERS ( 23 Jun 2018 20:30 )  x     / 0.01 ng/mL / 394 U/L / x     / x      CARDIAC MARKERS ( 22 Jun 2018 17:41 )  x     / <0.01 ng/mL / 805 U/L / x     / x

## 2018-06-24 NOTE — PHYSICAL THERAPY INITIAL EVALUATION ADULT - SPECIFY REASON(S)
Unable to see pt for PT initial evaluation, was upgraded to ICU, with bed rest orders. Will f/u when appropriate.

## 2018-06-24 NOTE — SWALLOW BEDSIDE ASSESSMENT ADULT - SWALLOW EVAL: RECOMMENDED FEEDING/EATING TECHNIQUES
allow for swallow between intakes/small sips/bites/check mouth frequently for oral residue/pocketing/crush medication (when feasible)/no straws/position upright (90 degrees)
oral hygiene

## 2018-06-24 NOTE — PROGRESS NOTE ADULT - ASSESSMENT
78f s/p apparent fall with multiple injuries    Plan:  f/u d-dimer and dvt sono  if no etiology for tachycardia found  cardiology consult  pain control 78f s/p apparent fall with multiple injuries    Plan:  f/u d-dimer and dvt sono  transfuse 2 units and f/u CBC  ICU upgrade  cxr to evaluate for possible aspiration - monitor O2 sat  strict is and os

## 2018-06-24 NOTE — SWALLOW BEDSIDE ASSESSMENT ADULT - SWALLOW EVAL: ORAL MUSCULATURE
extensive oral care provided prior to PO trials./unable to assess due to poor participation/comprehension
unable to assess due to poor participation/comprehension

## 2018-06-24 NOTE — SWALLOW BEDSIDE ASSESSMENT ADULT - COMMENTS
Dysphagia evaluation conducted bedside. Pt presented with lethargy, decreased responsiveness
chewables NT 2' to b/l mandibular fx; pt refused trials of thin liq via tsp.

## 2018-06-24 NOTE — PROGRESS NOTE ADULT - ASSESSMENT
78y Female s/p SAH, unwitness fall, multiple fractures upgraded for tachycardia and desaturating    PLAN:   Neurologic: baseline dementia; stage 4. Pain control with Tylenol. Neuro Checks Q1, on keppra [place] J collar for c-2 fx.  Respiratory: on 4L NC  Cardiovascular: keep normotensive  Gastrointestinal/Nutrition: NPO. PPI. speech & Swallow to re-evaluate pt aspirated   Renal/Genitourinary:  marquez in, monitor I/O's   Hematologic: trend H/H, last Hgb 6.6 2 U PRBCs  Infectious Disease: no abx   Endocrine: none  Disposition: ICU upgrade

## 2018-06-24 NOTE — PROGRESS NOTE ADULT - SUBJECTIVE AND OBJECTIVE BOX
78y Female PAST MEDICAL & SURGICAL HISTORY:  Dementia without behavioral disturbance, unspecified dementia type  No significant past surgical history      Hospital Day: 4    Events of the Last 24h:Pete was weaned off morphine fond to be tachycardic despite adequate fluid balance, responded partially to oral pain meds, duplex ordered ad d-dimer sent     Subjective: unable to assess    Vital Signs Last 24 Hrs  T(C): 36.9 (2018 00:00), Max: 37.1 (2018 08:11)  T(F): 98.5 (2018 00:00), Max: 98.7 (2018 08:11)  HR: 118 (2018 01:54) (102 - 124)  BP: 133/70 (2018 01:54) (133/70 - 198/86)  BP(mean): --  RR: 18 (2018 00:00) (18 - 18)  SpO2: 97% (2018 16:48) (97% - 97%)      I&O's Detail    2018 07:01  -  2018 07:00  --------------------------------------------------------  IN:    sodium chloride 0.9%: 300 mL    Solution: 100 mL  Total IN: 400 mL    OUT:    Indwelling Catheter - Urethral: 275 mL  Total OUT: 275 mL    Total NET: 125 mL      2018 07:01  -  2018 02:14  --------------------------------------------------------  IN:    Oral Fluid: 400 mL  Total IN: 400 mL    OUT:  Total OUT: 0 mL    Total NET: 400 mL          PHYSICAL EXAM:    GENERAL: NAD - dementia    HEENT: NCAT, c collar in place    CHEST/LUNGS: CTAB    HEART: tachycardia    ABDOMEN: SNTND +BS    EXTREMITIES:  FROM, left arm in splint     NEURO: No focal neurological deficits    SKIN: No rashes or lesions    Diet, Dysphagia 1 Pureed-Nectar Consistency Fluid (18 @ 11:07)    MEDICATIONS  (STANDING):  acetaminophen   Tablet. 650 milliGRAM(s) Oral every 6 hours  acetaminophen  Suppository. 650 milliGRAM(s) Rectal every 4 hours  amLODIPine   Tablet 5 milliGRAM(s) Oral daily  escitalopram 5 milliGRAM(s) Oral daily  gabapentin 100 milliGRAM(s) Oral daily  heparin  Injectable 5000 Unit(s) SubCutaneous every 8 hours  imatinib 400 milliGRAM(s) Oral daily  levETIRAcetam  IVPB 500 milliGRAM(s) IV Intermittent every 12 hours  LORazepam     Tablet 1 milliGRAM(s) Oral daily  memantine 10 milliGRAM(s) Oral daily  oxybutynin 10 milliGRAM(s) Oral daily  pantoprazole  Injectable 40 milliGRAM(s) IV Push daily    MEDICATIONS  (PRN):                              7.8    8.38  )-----------( 104      ( 2018 00:42 )             22.6                       143   |  104   |  18                 Ca: 8.6    BMP:   ----------------------------< 173    M.8   (18 @ 20:30)             4.1    |  23    | 1.2                Ph: 1.7      LFT:     TPro: 6.0 / Alb: 4.0 / TBili: 0.5 / DBili: x / AST: 57 / ALT: 32 / AlkPhos: 67   (18 @ 06:23)    LFTs:    Coags:    CARDIAC MARKERS ( 2018 20:30 )  x     / 0.01 ng/mL / 394 U/L / x     / x      CARDIAC MARKERS ( 2018 17:41 )  x     / <0.01 ng/mL / 805 U/L / x     / x                SPECTRA: [id number] 78y Female PAST MEDICAL & SURGICAL HISTORY:  Dementia without behavioral disturbance, unspecified dementia type  No significant past surgical history      Hospital Day: 4    Events of the Last 24h:Pete was weaned off morphine during the day. During the evening was found to be tachycardic despite adequate fluid balance, HR of 122 was reported, EKG and cardiac enzymes were sent and were found to be wnl. Senior resident was notified and plan was discussed. EKG showed sinus tachycardia with unspecified t wave abnormality. Gave fluid bolus and patient didn't respond. As patient was weaned off pain medication we gave two percocet to see if she responded, resulted in mild decrease in HR. Patient was observed and ddi not respond sufficiently. Decision was made to order d-dimer and send venous duplex. When evening labs returned patient was found to have drop in hemoglobin. Repeat was sent and confirmed significant decrease. Patient was also found to have slow decrease in O2 saturation. Patient was sent for pan scan, type and screen was performed. 2 units of PRBC were order and ICU consult was placed. On the way back from ct scan patient vomited. Plan was made for ICU upgrade    Subjective: unable to assess    Vital Signs Last 24 Hrs  T(C): 36.9 (2018 00:00), Max: 37.1 (2018 08:11)  T(F): 98.5 (2018 00:00), Max: 98.7 (2018 08:11)  HR: 118 (2018 01:54) (102 - 124)  BP: 133/70 (2018 01:54) (133/70 - 198/86)  BP(mean): --  RR: 18 (2018 00:00) (18 - 18)  SpO2: 97% (2018 16:48) (97% - 97%)      I&O's Detail    2018 07:01  -  2018 07:00  --------------------------------------------------------  IN:    sodium chloride 0.9%: 300 mL    Solution: 100 mL  Total IN: 400 mL    OUT:    Indwelling Catheter - Urethral: 275 mL  Total OUT: 275 mL    Total NET: 125 mL      2018 07:01  -  2018 02:14  --------------------------------------------------------  IN:    Oral Fluid: 400 mL  Total IN: 400 mL    OUT:  Total OUT: 0 mL    Total NET: 400 mL          PHYSICAL EXAM:    GENERAL: NAD - dementia    HEENT: NCAT, c collar in place    CHEST/LUNGS: CTAB    HEART: tachycardia    ABDOMEN: SNTND +BS    EXTREMITIES:  FROM, left arm in splint     NEURO: No focal neurological deficits    SKIN: No rashes or lesions    Diet, Dysphagia 1 Pureed-Nectar Consistency Fluid (18 @ 11:07)    MEDICATIONS  (STANDING):  acetaminophen   Tablet. 650 milliGRAM(s) Oral every 6 hours  acetaminophen  Suppository. 650 milliGRAM(s) Rectal every 4 hours  amLODIPine   Tablet 5 milliGRAM(s) Oral daily  escitalopram 5 milliGRAM(s) Oral daily  gabapentin 100 milliGRAM(s) Oral daily  heparin  Injectable 5000 Unit(s) SubCutaneous every 8 hours  imatinib 400 milliGRAM(s) Oral daily  levETIRAcetam  IVPB 500 milliGRAM(s) IV Intermittent every 12 hours  LORazepam     Tablet 1 milliGRAM(s) Oral daily  memantine 10 milliGRAM(s) Oral daily  oxybutynin 10 milliGRAM(s) Oral daily  pantoprazole  Injectable 40 milliGRAM(s) IV Push daily    MEDICATIONS  (PRN):                              7.8    8.38  )-----------( 104      ( 2018 00:42 )             22.6                       143   |  104   |  18                 Ca: 8.6    BMP:   ----------------------------< 173    M.8   (18 @ 20:30)             4.1    |  23    | 1.2                Ph: 1.7      LFT:     TPro: 6.0 / Alb: 4.0 / TBili: 0.5 / DBili: x / AST: 57 / ALT: 32 / AlkPhos: 67   (18 @ 06:23)    LFTs:    Coags:    CARDIAC MARKERS ( 2018 20:30 )  x     / 0.01 ng/mL / 394 U/L / x     / x      CARDIAC MARKERS ( 2018 17:41 )  x     / <0.01 ng/mL / 805 U/L / x     / x                SPECTRA: [id number]

## 2018-06-25 LAB
ANION GAP SERPL CALC-SCNC: 13 MMOL/L — SIGNIFICANT CHANGE UP (ref 7–14)
APPEARANCE UR: (no result)
APTT BLD: 40.7 SEC — HIGH (ref 27–39.2)
BACTERIA # UR AUTO: (no result) /HPF
BASE EXCESS BLDA CALC-SCNC: 6.3 MMOL/L — HIGH (ref -2–2)
BILIRUB UR-MCNC: NEGATIVE — SIGNIFICANT CHANGE UP
BUN SERPL-MCNC: 22 MG/DL — HIGH (ref 10–20)
CALCIUM SERPL-MCNC: 8.4 MG/DL — LOW (ref 8.5–10.1)
CHLORIDE SERPL-SCNC: 105 MMOL/L — SIGNIFICANT CHANGE UP (ref 98–110)
CO2 SERPL-SCNC: 25 MMOL/L — SIGNIFICANT CHANGE UP (ref 17–32)
COLOR SPEC: YELLOW — SIGNIFICANT CHANGE UP
CREAT SERPL-MCNC: 1 MG/DL — SIGNIFICANT CHANGE UP (ref 0.7–1.5)
DIFF PNL FLD: (no result)
GLUCOSE SERPL-MCNC: 129 MG/DL — HIGH (ref 70–99)
GLUCOSE UR QL: NEGATIVE MG/DL — SIGNIFICANT CHANGE UP
HCO3 BLDA-SCNC: 31 MMOL/L — HIGH (ref 23–27)
HCT VFR BLD CALC: 24.2 % — LOW (ref 37–47)
HGB BLD-MCNC: 8.6 G/DL — LOW (ref 12–16)
HOROWITZ INDEX BLDA+IHG-RTO: 28 — SIGNIFICANT CHANGE UP
INR BLD: 1.19 RATIO — SIGNIFICANT CHANGE UP (ref 0.65–1.3)
KETONES UR-MCNC: NEGATIVE — SIGNIFICANT CHANGE UP
LEUKOCYTE ESTERASE UR-ACNC: (no result)
MAGNESIUM SERPL-MCNC: 1.9 MG/DL — SIGNIFICANT CHANGE UP (ref 1.8–2.4)
MCHC RBC-ENTMCNC: 32.2 PG — HIGH (ref 27–31)
MCHC RBC-ENTMCNC: 35.5 G/DL — SIGNIFICANT CHANGE UP (ref 32–37)
MCV RBC AUTO: 90.6 FL — SIGNIFICANT CHANGE UP (ref 81–99)
NITRITE UR-MCNC: POSITIVE
NRBC # BLD: 0 /100 WBCS — SIGNIFICANT CHANGE UP (ref 0–0)
PCO2 BLDA: 43 MMHG — HIGH (ref 38–42)
PH BLDA: 7.46 — HIGH (ref 7.38–7.42)
PH UR: 7 — SIGNIFICANT CHANGE UP (ref 5–8)
PHOSPHATE SERPL-MCNC: 3 MG/DL — SIGNIFICANT CHANGE UP (ref 2.1–4.9)
PLATELET # BLD AUTO: 105 K/UL — LOW (ref 130–400)
PO2 BLDA: 81 MMHG — SIGNIFICANT CHANGE UP (ref 78–95)
POTASSIUM SERPL-MCNC: 3.8 MMOL/L — SIGNIFICANT CHANGE UP (ref 3.5–5)
POTASSIUM SERPL-SCNC: 3.8 MMOL/L — SIGNIFICANT CHANGE UP (ref 3.5–5)
PROT UR-MCNC: 100 MG/DL
PROTHROM AB SERPL-ACNC: 12.9 SEC — HIGH (ref 9.95–12.87)
RBC # BLD: 2.67 M/UL — LOW (ref 4.2–5.4)
RBC # FLD: 17 % — HIGH (ref 11.5–14.5)
RBC CASTS # UR COMP ASSIST: (no result) /HPF
SAO2 % BLDA: 98 % — SIGNIFICANT CHANGE UP (ref 94–98)
SODIUM SERPL-SCNC: 143 MMOL/L — SIGNIFICANT CHANGE UP (ref 135–146)
SP GR SPEC: 1.02 — SIGNIFICANT CHANGE UP (ref 1.01–1.03)
TROPONIN T SERPL-MCNC: <0.01 NG/ML — SIGNIFICANT CHANGE UP
UROBILINOGEN FLD QL: 1 MG/DL (ref 0.2–0.2)
WBC # BLD: 7.74 K/UL — SIGNIFICANT CHANGE UP (ref 4.8–10.8)
WBC # FLD AUTO: 7.74 K/UL — SIGNIFICANT CHANGE UP (ref 4.8–10.8)
WBC UR QL: SIGNIFICANT CHANGE UP /HPF

## 2018-06-25 PROCEDURE — 99291 CRITICAL CARE FIRST HOUR: CPT

## 2018-06-25 PROCEDURE — 93970 EXTREMITY STUDY: CPT | Mod: 26

## 2018-06-25 RX ORDER — VANCOMYCIN HCL 1 G
VIAL (EA) INTRAVENOUS
Qty: 0 | Refills: 0 | Status: DISCONTINUED | OUTPATIENT
Start: 2018-06-25 | End: 2018-06-28

## 2018-06-25 RX ORDER — POTASSIUM CHLORIDE 20 MEQ
10 PACKET (EA) ORAL ONCE
Qty: 0 | Refills: 0 | Status: COMPLETED | OUTPATIENT
Start: 2018-06-25 | End: 2018-06-25

## 2018-06-25 RX ORDER — CEFEPIME 1 G/1
500 INJECTION, POWDER, FOR SOLUTION INTRAMUSCULAR; INTRAVENOUS EVERY 12 HOURS
Qty: 0 | Refills: 0 | Status: DISCONTINUED | OUTPATIENT
Start: 2018-06-26 | End: 2018-06-29

## 2018-06-25 RX ORDER — LABETALOL HCL 100 MG
200 TABLET ORAL EVERY 6 HOURS
Qty: 0 | Refills: 0 | Status: DISCONTINUED | OUTPATIENT
Start: 2018-06-25 | End: 2018-06-25

## 2018-06-25 RX ORDER — VANCOMYCIN HCL 1 G
1000 VIAL (EA) INTRAVENOUS ONCE
Qty: 0 | Refills: 0 | Status: COMPLETED | OUTPATIENT
Start: 2018-06-25 | End: 2018-06-25

## 2018-06-25 RX ORDER — CEFEPIME 1 G/1
INJECTION, POWDER, FOR SOLUTION INTRAMUSCULAR; INTRAVENOUS
Qty: 0 | Refills: 0 | Status: DISCONTINUED | OUTPATIENT
Start: 2018-06-25 | End: 2018-06-29

## 2018-06-25 RX ORDER — DEXTROSE MONOHYDRATE, SODIUM CHLORIDE, AND POTASSIUM CHLORIDE 50; .745; 4.5 G/1000ML; G/1000ML; G/1000ML
1000 INJECTION, SOLUTION INTRAVENOUS
Qty: 0 | Refills: 0 | Status: DISCONTINUED | OUTPATIENT
Start: 2018-06-25 | End: 2018-06-27

## 2018-06-25 RX ORDER — VANCOMYCIN HCL 1 G
1000 VIAL (EA) INTRAVENOUS EVERY 12 HOURS
Qty: 0 | Refills: 0 | Status: DISCONTINUED | OUTPATIENT
Start: 2018-06-26 | End: 2018-06-28

## 2018-06-25 RX ORDER — LABETALOL HCL 100 MG
10 TABLET ORAL ONCE
Qty: 0 | Refills: 0 | Status: COMPLETED | OUTPATIENT
Start: 2018-06-25 | End: 2018-06-25

## 2018-06-25 RX ORDER — CEFEPIME 1 G/1
500 INJECTION, POWDER, FOR SOLUTION INTRAMUSCULAR; INTRAVENOUS ONCE
Qty: 0 | Refills: 0 | Status: COMPLETED | OUTPATIENT
Start: 2018-06-25 | End: 2018-06-25

## 2018-06-25 RX ORDER — LABETALOL HCL 100 MG
200 TABLET ORAL
Qty: 0 | Refills: 0 | Status: DISCONTINUED | OUTPATIENT
Start: 2018-06-25 | End: 2018-06-27

## 2018-06-25 RX ADMIN — CEFEPIME 100 MILLIGRAM(S): 1 INJECTION, POWDER, FOR SOLUTION INTRAMUSCULAR; INTRAVENOUS at 17:51

## 2018-06-25 RX ADMIN — Medication 650 MILLIGRAM(S): at 12:56

## 2018-06-25 RX ADMIN — Medication 100 MILLIEQUIVALENT(S): at 05:36

## 2018-06-25 RX ADMIN — Medication 650 MILLIGRAM(S): at 05:36

## 2018-06-25 RX ADMIN — LEVETIRACETAM 420 MILLIGRAM(S): 250 TABLET, FILM COATED ORAL at 13:07

## 2018-06-25 RX ADMIN — Medication 650 MILLIGRAM(S): at 00:02

## 2018-06-25 RX ADMIN — MEMANTINE HYDROCHLORIDE 10 MILLIGRAM(S): 10 TABLET ORAL at 12:57

## 2018-06-25 RX ADMIN — Medication 200 MILLIGRAM(S): at 18:03

## 2018-06-25 RX ADMIN — Medication 650 MILLIGRAM(S): at 13:07

## 2018-06-25 RX ADMIN — MORPHINE SULFATE 5 MILLIGRAM(S): 50 CAPSULE, EXTENDED RELEASE ORAL at 18:03

## 2018-06-25 RX ADMIN — MORPHINE SULFATE 5 MILLIGRAM(S): 50 CAPSULE, EXTENDED RELEASE ORAL at 13:07

## 2018-06-25 RX ADMIN — LEVETIRACETAM 420 MILLIGRAM(S): 250 TABLET, FILM COATED ORAL at 23:38

## 2018-06-25 RX ADMIN — HEPARIN SODIUM 5000 UNIT(S): 5000 INJECTION INTRAVENOUS; SUBCUTANEOUS at 05:36

## 2018-06-25 RX ADMIN — ESCITALOPRAM OXALATE 5 MILLIGRAM(S): 10 TABLET, FILM COATED ORAL at 12:57

## 2018-06-25 RX ADMIN — HEPARIN SODIUM 5000 UNIT(S): 5000 INJECTION INTRAVENOUS; SUBCUTANEOUS at 21:38

## 2018-06-25 RX ADMIN — Medication 650 MILLIGRAM(S): at 06:50

## 2018-06-25 RX ADMIN — Medication 650 MILLIGRAM(S): at 18:03

## 2018-06-25 RX ADMIN — MORPHINE SULFATE 5 MILLIGRAM(S): 50 CAPSULE, EXTENDED RELEASE ORAL at 05:36

## 2018-06-25 RX ADMIN — AMLODIPINE BESYLATE 5 MILLIGRAM(S): 2.5 TABLET ORAL at 05:36

## 2018-06-25 RX ADMIN — MORPHINE SULFATE 5 MILLIGRAM(S): 50 CAPSULE, EXTENDED RELEASE ORAL at 06:50

## 2018-06-25 RX ADMIN — LEVETIRACETAM 420 MILLIGRAM(S): 250 TABLET, FILM COATED ORAL at 00:48

## 2018-06-25 RX ADMIN — Medication 3 MILLILITER(S): at 08:14

## 2018-06-25 RX ADMIN — PANTOPRAZOLE SODIUM 40 MILLIGRAM(S): 20 TABLET, DELAYED RELEASE ORAL at 12:58

## 2018-06-25 RX ADMIN — Medication 3 MILLILITER(S): at 02:58

## 2018-06-25 RX ADMIN — Medication 10 MILLIGRAM(S): at 06:56

## 2018-06-25 RX ADMIN — MORPHINE SULFATE 5 MILLIGRAM(S): 50 CAPSULE, EXTENDED RELEASE ORAL at 00:02

## 2018-06-25 RX ADMIN — Medication 10 MILLIGRAM(S): at 12:58

## 2018-06-25 RX ADMIN — Medication 250 MILLIGRAM(S): at 18:47

## 2018-06-25 RX ADMIN — MORPHINE SULFATE 5 MILLIGRAM(S): 50 CAPSULE, EXTENDED RELEASE ORAL at 19:30

## 2018-06-25 RX ADMIN — GABAPENTIN 100 MILLIGRAM(S): 400 CAPSULE ORAL at 12:58

## 2018-06-25 RX ADMIN — Medication 650 MILLIGRAM(S): at 00:46

## 2018-06-25 RX ADMIN — MORPHINE SULFATE 5 MILLIGRAM(S): 50 CAPSULE, EXTENDED RELEASE ORAL at 00:45

## 2018-06-25 RX ADMIN — DEXTROSE MONOHYDRATE, SODIUM CHLORIDE, AND POTASSIUM CHLORIDE 75 MILLILITER(S): 50; .745; 4.5 INJECTION, SOLUTION INTRAVENOUS at 22:29

## 2018-06-25 RX ADMIN — Medication 200 MILLIGRAM(S): at 12:57

## 2018-06-25 RX ADMIN — HEPARIN SODIUM 5000 UNIT(S): 5000 INJECTION INTRAVENOUS; SUBCUTANEOUS at 13:07

## 2018-06-25 RX ADMIN — MORPHINE SULFATE 5 MILLIGRAM(S): 50 CAPSULE, EXTENDED RELEASE ORAL at 13:00

## 2018-06-25 NOTE — CONSULT NOTE ADULT - ASSESSMENT
IMPRESSION: Rehab of multitrauma, dementia    PRECAUTIONS: [ x   ] Cardiac  [   x ] Respiratory  [    ] Seizures [    ] Contact Isolation  [    ] Droplet Isolation  [    ] Other    Weight Bearing Status: REYES Muir    RECOMMENDATION:    Out of Bed to Chair     DVT/Decubiti Prophylaxis    REHAB PLAN:     [  x   ] Bedside P/T 3-5 times a week   [     ] Bedside O/T  2-3 times a week   [     ] No Rehab Therapy Indicated   [     ]  Speech Therapy   Conditioning/ROM                                 ADL  Bed Mobility                                            Conditioning/ROM  Transfers                                                  Bed Mobility  Sitting /Standing Balance                      Transfers                                        Gait Training                                            Sitting/Standing Balance  Stair Training [   ]Applicable                 Home equipment Eval                                                                     Splinting  [   ] Only      GOALS:   ADL   [    ]   Independent         Transfers  [    ] Independent            Ambulation  [     ] Independent     [     ] With device                            [    ]  CG                                               [    ]  CG                                                    [     ] CG                            [   x ] Min A                                          [x    ] Min A                                                [  x   ] Min  A  x        DISCHARGE PLAN:   [     ]  Good candidate for Intensive Rehabilitation/Hospital based-4A [facility]                                             Will tolerate 3hrs Intensive Rehab Daily                                       [   x   ]  Short Term Rehab in Skilled Nursing Facility                                vs                                     [ x     ]  Home with Outpatient or VN services  24X 7 CARE                                         [      ]  Possible Candidate for Intensive Hospital based Rehab

## 2018-06-25 NOTE — PROGRESS NOTE ADULT - SUBJECTIVE AND OBJECTIVE BOX
78yFemale with diagnosis: SUBARACHNOID BLEED; FRACTURE OF CERVICAL SPINE WITHOUT SPINAL CORD LEASION      Patient seen, chart reviewed, events noted.  Pt upgraded to ICU level care for hypoxia and tachycardia. Remains lethargic. Pain well controlled      PHYSICAL EXAM unchanged    T(C): , Max: 38.4 (16:07)  T(F): 101.1  HR: 96 (92 - 114)  BP: 155/77 (140/66 - 213/91)  RR: 18 (8 - 22)  SpO2: 99% (96% - 99%)              LABS:                          8.6    7.74  )-----------( 105      ( 25 Jun 2018 00:44 )             24.2                                                                                      06-25    143  |  105  |  22<H>  ----------------------------<  129<H>  3.8   |  25  |  1.0    Ca    8.4<L>      25 Jun 2018 00:44  Phos  3.0     06-25  Mg     1.9     06-25                                                        MEDICATIONS  (STANDING):  acetaminophen   Tablet. 650 milliGRAM(s) Oral every 6 hours  ALBUTerol/ipratropium for Nebulization 3 milliLiter(s) Nebulizer every 6 hours  amLODIPine   Tablet 5 milliGRAM(s) Oral daily  cefepime   IVPB      cefepime   IVPB 500 milliGRAM(s) IV Intermittent once  escitalopram 5 milliGRAM(s) Oral daily  gabapentin 100 milliGRAM(s) Oral daily  heparin  Injectable 5000 Unit(s) SubCutaneous every 8 hours  imatinib 400 milliGRAM(s) Oral daily  labetalol 200 milliGRAM(s) Oral every 6 hours  levETIRAcetam  IVPB 500 milliGRAM(s) IV Intermittent every 12 hours  memantine 10 milliGRAM(s) Oral daily  morphine Concentrate 5 milliGRAM(s) Oral every 6 hours  oxybutynin 10 milliGRAM(s) Oral daily  pantoprazole  Injectable 40 milliGRAM(s) IV Push daily  vancomycin  IVPB      vancomycin  IVPB 1000 milliGRAM(s) IV Intermittent once    MEDICATIONS  (PRN):

## 2018-06-25 NOTE — CONSULT NOTE ADULT - CONSULT REQUESTED DATE/TIME
21-Jun-2018
21-Jun-2018 09:49
21-Jun-2018 10:50
21-Jun-2018 12:12
22-Jun-2018 14:49
23-Jun-2018 14:00
25-Jun-2018 15:08
22-Jun-2018 14:42

## 2018-06-25 NOTE — DIETITIAN INITIAL EVALUATION ADULT. - ENERGY NEEDS
Calories: 2354-9056 kcals/day (MSJ x 1.2-1.3)  Protein: 71-85 g/day (1-1.2 g/kg ABW)  Fluids: per ICU team

## 2018-06-25 NOTE — PROGRESS NOTE ADULT - ASSESSMENT
78yFemale being evaluated for goals of care.     No family present at time of visit.  Plan for family meeting tomorrow 6/26 at 4pm    Recommendations:  Cont SICU mngmnt    We will follow

## 2018-06-25 NOTE — DIETITIAN INITIAL EVALUATION ADULT. - FACTORS AFF FOOD INTAKE
SLP unable to do PO trials at this time 2/2 lethargy, minimally arousable, and had possible aspiration event during this admission

## 2018-06-25 NOTE — SWALLOW BEDSIDE ASSESSMENT ADULT - SLP PERTINENT HISTORY OF CURRENT PROBLEM
Pt admitted s/p fall at home, C2 fx, SAH, B/L mandibular fx. PMHx: dementia. Per trauma team, pt with possible aspiration event during current admission

## 2018-06-25 NOTE — DIETITIAN INITIAL EVALUATION ADULT. - ORAL INTAKE PTA
reports pt had very good rodger/PO intake PTA, ate everything and regular consistency, except for tough pieces of meat, such as steak/good

## 2018-06-25 NOTE — DIETITIAN INITIAL EVALUATION ADULT. - NS AS NUTRI INTERV ENTERAL NUTRITION
When feasible, start NGT feeds of Jevity 1.2 @ 240 q 8. If tolerating, advance to goal rate within 24 hours of Jevity 1.2 @ 240 q 4 (hold 2 am feeds) for total of 5 feeds/day. Provide 1 packet prostat q 24 hours. Will provide 1540 kcals, 82 g pro, 972 mls free H20. Flushes per LIP. Check mag, phos, K prior to starting and replete prn.

## 2018-06-25 NOTE — DIETITIAN INITIAL EVALUATION ADULT. - OTHER INFO
Pt s/p fall with stage 4 dementia. RD assessment for NPO day 5. SLP recommends NPO w/ alternate means nutrition/hydration at this time. Family does not know UBW but denies any recent wt changes.

## 2018-06-25 NOTE — PROGRESS NOTE ADULT - ASSESSMENT
Assessment and Plan:   · Assessment		  78y Female s/p SAH, unwitness fall, multiple fractures upgraded for tachycardia and desaturating    PLAN:   Neurologic: baseline dementia; stage 4. Pain control with Tylenol. Neuro Checks Q1, on keppra [place] J collar for c-2 fx.  Respiratory: on 4L NC  Cardiovascular: keep normotensive  Gastrointestinal/Nutrition: NPO. PPI. speech & Swallow to re-evaluate pt aspirated   Renal/Genitourinary:  marquez in, monitor I/O's   Hematologic: trend H/H, last Hgb 6.6 2 U PRBCs  Infectious Disease: no abx   Endocrine: none  Disposition: ICU upgrade Assessment and Plan:   · Assessment		  78y Female s/p SAH, unwitness fall, multiple fractures upgraded for tachycardia and desaturating    PLAN:   Neurologic: baseline dementia; stage 4. Pain control with Tylenol. Neuro Checks Q1, on keppra, [place] J collar for c-2 fx.  Respiratory: on 4L NC. Duplex negative for PE  Cardiovascular: keep normotensive  Gastrointestinal/Nutrition: NPO. PPI. speech & Swallow to re-evaluate pt aspirated   Renal/Genitourinary:  marquez in, monitor I/O's   Hematologic: trend H/H, last Hgb 6.6 2 U PRBCs  Infectious Disease: no abx   Endocrine: none  Disposition: ICU upgrade Assessment and Plan:   · Assessment		  78y Female s/p SAH, unwitness fall, multiple fractures upgraded for tachycardia and desaturating    PLAN:   Neurologic:  metabolic encpehalopathy in setting of severe dementia; ddx still includes stroke, developing infection  will pan-cx, cbc w/ diff, ua, start vanc cefe  baseline dementia; stage 4. Pain control with Tylenol. Neuro Checks Q1, on keppra, [place] J collar for c-2 fx.    Respiratory: on 4L NC. Duplex negative for PE    Cardiovascular: keep normotensive    Gastrointestinal/Nutrition: NPO. PPI. speech & Swallow to re-evaluate pt aspirated     Renal/Genitourinary:  marquez in, monitor I/O's     Hematologic: trend H/H, last Hgb 6.6 2 U PRBCs  Infectious Disease: no abx   Endocrine:    family meeting

## 2018-06-25 NOTE — PHYSICAL THERAPY INITIAL EVALUATION ADULT - SPECIFY REASON(S)
Chart reviewed. Pt. upgraded to ICU; Pt. currently on bedrest. PT evaluation on hold pending OOB orders as appropriate. Will follow.

## 2018-06-25 NOTE — CONSULT NOTE ADULT - SUBJECTIVE AND OBJECTIVE BOX
Patient is a 78y old  Female who presents with a chief complaint of fall  HPI:  77y/o F with PMHx of Dementia stage 4, presents for evaluation of possible fall, unknown onset and unknown LOC. The patient lives at home with daughter and home aide, last night she went to bed and was later heard crying for help next to her bed. The patient was taken to ED for evaluation. signs of trauma, left face hematoma, lt shoulder pain, lt wrist pain. lt thigh hematoma (2018 12:38)      PAST MEDICAL & SURGICAL HISTORY:  Dementia without behavioral disturbance, unspecified dementia type  No significant past surgical history      Hospital Course:  Head CT showed Small scattered left-sided subarachnoid hemorrhage but no significant mass effect. CT of cervical spine showed minimally displaced avulsion fracture of the anterior-inferior C2, vertebral body with mild widening of the anterior C2-C3 disc space, consistent with extension teardrop fracture. CT Chest with IV contrast showed acute fractures of the left posterior 10th and 11th, new indeterminate 2.2 cm left suprarenal mass and no pneumothorax. CT maxillofacial showed acute nondisplaced fractures of the mandibular bodies bilaterally. The patient was transfered to ICU for continued care     Neuro: decreased mental status  Resp: resp distress on the floor. Upgraded for pulm toilet, duoned. CT abd chest chest pelvis. no PE   CVS; Hypertensive to 220---labetalol 10mg   GI: NPO. speech and swallow consult pending   Disposition: spoke to [facility] about advance direct no decision, waiting for family member to come in, try to schedule [facility] meeting this week. pt  full code,   : no marquez  TODAY'S SUBJECTIVE & REVIEW OF SYMPTOMS:     Constitutional WNL   Cardio WNL   Resp WNL   GI WNL  Heme WNL  Endo WNL  Skin WNL  MSK WNL  Neuro WNL  Cognitive WNL  Psych WNL  + incontinence    MEDICATIONS  (STANDING):  acetaminophen   Tablet. 650 milliGRAM(s) Oral every 6 hours  ALBUTerol/ipratropium for Nebulization 3 milliLiter(s) Nebulizer every 6 hours  amLODIPine   Tablet 5 milliGRAM(s) Oral daily  escitalopram 5 milliGRAM(s) Oral daily  gabapentin 100 milliGRAM(s) Oral daily  heparin  Injectable 5000 Unit(s) SubCutaneous every 8 hours  imatinib 400 milliGRAM(s) Oral daily  labetalol 200 milliGRAM(s) Oral every 6 hours  levETIRAcetam  IVPB 500 milliGRAM(s) IV Intermittent every 12 hours  memantine 10 milliGRAM(s) Oral daily  morphine Concentrate 5 milliGRAM(s) Oral every 6 hours  oxybutynin 10 milliGRAM(s) Oral daily  pantoprazole  Injectable 40 milliGRAM(s) IV Push daily    MEDICATIONS  (PRN):      FAMILY HISTORY:  No pertinent family history in first degree relatives      Allergies    No Known Allergies    Intolerances        SOCIAL HISTORY:    [    ] Etoh  [    ] Smoking  [    ] Substance abuse     Home Environment:  [    ] Home Alone  [x    ] Lives with Family  [  x  ] Home Health Aid 24 x7    Dwelling:  [  x  ] Apartment  [ x   ] Private House  [    ] Adult Home  [    ] Skilled Nursing Facility      [    ] Short Term  [    ] Long Term  [  x  ] Stairs                           [    ] Elevator     FUNCTIONAL STATUS PTA: (Check all that apply)  Ambulation: [     ]Independent    [x    ] Dependent     [    ] Non-Ambulatory  Assistive Device: [    ] SA Cane  [    ]  Q Cane  [    ] Walker  [    ]  Wheelchair  ADL : [    ] Independent  [  x  ]  Dependent   ONE ARM ASSIST NO DEVICE    Vital Signs Last 24 Hrs  T(C): 37.8 (2018 12:00), Max: 38.5 (2018 16:00)  T(F): 100.1 (2018 12:00), Max: 101.3 (2018 16:00)  HR: 96 (2018 14:30) (96 - 114)  BP: 160/61 (2018 14:30) (140/66 - 213/91)  BP(mean): 82 (2018 14:30) (82 - 155)  RR: 22 (2018 14:30) (8 - 22)  SpO2: 98% (2018 14:30) (96% - 100%)      PHYSICAL EXAM: Eyes open not folowing commands  GENERAL: NAD, well-groomed, well-developed  HEAD:  + facial ecchymoses, +NGT, +O2  EYES: EOMI, PERRLA, conjunctiva and sclera clear  NECK: Supple, No JVD, Normal thyroid  CHEST/LUNG: Clear to percussion bilaterally; No rales, rhonchi, wheezing, or rubs  HEART: Regular rate and rhythm; No murmurs, rubs, or gallops  ABDOMEN: Soft, Nontender, Nondistended; Bowel sounds present  EXTREMITIES:  L Arm in splint, no calf tenderness    NERVOUS SYSTEM:  Cranial Nerves 2-12 intact [ x   ] Abnormal  [    ]  ROM: WFL all extremities [    ]  Abnormal [   x  ]except L Wrist  Motor Strength: WFL all extremities  [    ]  Abnormal [  x  ]Grossly 3/5 all ext  Sensation: intact to light touch [  x  ] Abnormal [    ]withdraws      FUNCTIONAL STATUS:  Bed Mobility: [   ]  Independent [    ]  Supervision [ x   ]  Needs Assistance [  ]  N/A  Transfers: [    ]  Independent [    ]  Supervision [    ]  Needs Assistance [ x   ]  N/A    Ambulation:  [    ]  Independent [    ]  Supervision [    ]  Needs Assistance [  x  ]  N/A   ADL:  [    ]   Independent [  x ] Requires Assistance [    ] N/A       LABS:                        8.6    7.74  )-----------( 105      ( 2018 00:44 )             24.2     06-25    143  |  105  |  22<H>  ----------------------------<  129<H>  3.8   |  25  |  1.0    Ca    8.4<L>      2018 00:44  Phos  3.0     06-25  Mg     1.9     06-25      PT/INR - ( 2018 00:44 )   PT: 12.90 sec;   INR: 1.19 ratio         PTT - ( 2018 00:44 )  PTT:40.7 sec  Urinalysis Basic - ( 2018 09:54 )    Color: Yellow / Appearance: Cloudy / S.020 / pH: x  Gluc: x / Ketone: Negative  / Bili: Negative / Urobili: 1.0 mg/dL   Blood: x / Protein: 100 mg/dL / Nitrite: Positive   Leuk Esterase: Small / RBC: 2-5 /HPF / WBC 3-5 /HPF   Sq Epi: x / Non Sq Epi: x / Bacteria: Many /HPF        RADIOLOGY & ADDITIONAL STUDIES:

## 2018-06-25 NOTE — PROGRESS NOTE ADULT - SUBJECTIVE AND OBJECTIVE BOX
KYM ROTH  [id number]  78y Female    Indication for ICU admission: s/p fall   Admit Date:06-21-18  ICU Date:  OR Date:     HPI 77y/o F with PMHx of Dementia stage 4, presents for evaluation of possible fall, unknown onset and unknown LOC. The patient lives at home with daughter and home aide, last night she went to bed and was later heard crying for help next to her bed. The patient was taken to ED for evaluation of signs of trauma, left face hematoma, left shoulder pain, left wrist pain and left thigh hematoma. Head CT showed Small scattered left-sided subarachnoid hemorrhage but no significant mass effect. CT of cervical spine showed minimally displaced avulsion fracture of the anterior-inferior C2, vertebral body with mild widening of the anterior C2-C3 disc space, consistent with extension teardrop fracture. CT Chest with IV contrast showed acute fractures of the left posterior 10th and 11th, new indeterminate 2.2 cm left suprarenal mass and no pneumothorax. CT maxillofacial showed acute nondisplaced fractures of the mandibular bodies bilaterally. The patient was transfered to ICU for continued care       24HRS EVENT:  resp distress on the floor, decreased mental status, ct abd pelvis chest, no intra or chest path upgraded for pulyaima sinclair, spoke to [facility] about advance direct no decsion, waiting for family member to come in, try to schedule [facility] meeting this week , npo, douneb, speech and swallow, pall consult pending  full code,             DVT PTX:     GI PTX:    ***Tubes/Lines/Drains  ***  Peripheral IV  Central Venous Line     	Date   Arterial Line		                Date   [] PICC:         	[] Midline		[] Mediport             Urinary Catheter		Indication: Strict I&O    Date Placed:       REVIEW OF SYSTEMS    [ ] A ten-point review of systems was otherwise negative except as noted.  [ ] Due to altered mental status/intubation, subjective information were not able to be obtained from the patient. History was obtained, to the extent possible, from review of the chart and collateral sources of information. KYM ROTH  [id number]  78y Female    Indication for ICU admission: s/p fall   Admit Date:18  ICU Date:  OR Date:     HPI 77y/o F with PMHx of Dementia stage 4, presents for evaluation of possible fall, unknown onset and unknown LOC. The patient lives at home with daughter and home aide, last night she went to bed and was later heard crying for help next to her bed. The patient was taken to ED for evaluation of signs of trauma, left face hematoma, left shoulder pain, left wrist pain and left thigh hematoma. Head CT showed Small scattered left-sided subarachnoid hemorrhage but no significant mass effect. CT of cervical spine showed minimally displaced avulsion fracture of the anterior-inferior C2, vertebral body with mild widening of the anterior C2-C3 disc space, consistent with extension teardrop fracture. CT Chest with IV contrast showed acute fractures of the left posterior 10th and 11th, new indeterminate 2.2 cm left suprarenal mass and no pneumothorax. CT maxillofacial showed acute nondisplaced fractures of the mandibular bodies bilaterally. The patient was transfered to ICU for continued care       24HRS EVENT:  Neuro: decreased mental status  Resp: resp distress on the floor. Upgraded for pulm toilet, duoned. CT abd chest chest pelvis   CVS; Hypertensive to 220---labetalol 10mg   GI: NPO. speech and swallow consult pending   Disposition: spoke to [facility] about advance direct no decision, waiting for family member to come in, try to schedule [facility] meeting this week. pt  full code,             DVT PTX:     GI PTX:    ***Tubes/Lines/Drains  ***  Peripheral IV  Central Venous Line     	Date   Arterial Line		                Date   [] PICC:         	[] Midline		[] Mediport             Urinary Catheter		Indication: Strict I&O    Date Placed:       REVIEW OF SYSTEMS    [X ] Due to altered mental status/intubation, subjective information were not able to be obtained from the patient. History was obtained, to the extent possible, from review of the chart and collateral sources of information.    Daily Height in cm: 167.64 (2018 10:00)    Daily Weight in k.6 (2018 04:00)    Diet, NPO:   Except Medications (18 @ 21:16)      CURRENT MEDS:  Neurologic Medications  acetaminophen   Tablet. 650 milliGRAM(s) Oral every 6 hours  escitalopram 5 milliGRAM(s) Oral daily  gabapentin 100 milliGRAM(s) Oral daily  levETIRAcetam  IVPB 500 milliGRAM(s) IV Intermittent every 12 hours  memantine 10 milliGRAM(s) Oral daily  morphine Concentrate 5 milliGRAM(s) Oral every 6 hours    Respiratory Medications  ALBUTerol/ipratropium for Nebulization 3 milliLiter(s) Nebulizer every 6 hours    Cardiovascular Medications  amLODIPine   Tablet 5 milliGRAM(s) Oral daily    Gastrointestinal Medications  pantoprazole  Injectable 40 milliGRAM(s) IV Push daily    Genitourinary Medications  oxybutynin 10 milliGRAM(s) Oral daily    Hematologic/Oncologic Medications  heparin  Injectable 5000 Unit(s) SubCutaneous every 8 hours  imatinib 400 milliGRAM(s) Oral daily    Antimicrobial/Immunologic Medications    Endocrine/Metabolic Medications    Topical/Other Medications      ICU Vital Signs Last 24 Hrs  T(C): 37.9 (2018 07:30), Max: 38.5 (2018 16:00)  T(F): 100.3 (2018 07:30), Max: 101.3 (2018 16:00)  HR: 100 (2018 07:30) (96 - 128)  BP: 156/63 (2018 07:30) (140/66 - 207/90)  BP(mean): 94 (2018 07:30) (82 - 148)  ABP: --  ABP(mean): --  RR: 16 (2018 07:30) (14 - 22)  SpO2: 99% (2018 07:30) (94% - 100%)        I&O's Summary    2018 07:  -  2018 07:00  --------------------------------------------------------  IN: 1347 mL / OUT: 0 mL / NET: 1347 mL      I&O's Detail    2018 07:  -  2018 07:00  --------------------------------------------------------  IN:    IV PiggyBack: 650 mL    Packed Red Blood Cells: 597 mL    Solution: 100 mL  Total IN: 1347 mL    OUT:  Total OUT: 0 mL    Total NET: 1347 mL          PHYSICAL EXAM:    General/Neuro: dementia at baseline.  not following commands but withdraw to pain  C. Collar in place   minima edema adjacent to mandible fx    Lungs:      clear to auscultation, Normal expansion/effort.     Cardiovascular : S1, S2.  Regular rate and rhythm.    Cardiac Rhythm:  Sinus Tachy    GI: Abdomen soft, Non-tender, Non-distended.      Extremities: left arm in splint... Pulses: palpable b/l     :       Urban catheter in place.      CXR:     LABS:  CAPILLARY BLOOD GLUCOSE                              8.6    7.74  )-----------( 105      ( 2018 00:44 )             24.2       06-    143  |  105  |  22<H>  ----------------------------<  129<H>  3.8   |  25  |  1.0    Ca    8.4<L>      2018 00:44  Phos  3.0     06-  Mg     1.9     06-25        PT/INR - ( 2018 00:44 )   PT: 12.90 sec;   INR: 1.19 ratio         PTT - ( 2018 00:44 )  PTT:40.7 sec  CARDIAC MARKERS ( 2018 20:30 )  x     / 0.01 ng/mL / 394 U/L / x     / x KYM ROTH  [id number]  78y Female    Indication for ICU admission: s/p fall   Admit Date:18  ICU Date:  OR Date:     HPI 79y/o F with PMHx of Dementia stage 4, presents for evaluation of possible fall, unknown onset and unknown LOC. The patient lives at home with daughter and home aide, last night she went to bed and was later heard crying for help next to her bed. The patient was taken to ED for evaluation of signs of trauma, left face hematoma, left shoulder pain, left wrist pain and left thigh hematoma. Head CT showed Small scattered left-sided subarachnoid hemorrhage but no significant mass effect. CT of cervical spine showed minimally displaced avulsion fracture of the anterior-inferior C2, vertebral body with mild widening of the anterior C2-C3 disc space, consistent with extension teardrop fracture. CT Chest with IV contrast showed acute fractures of the left posterior 10th and 11th, new indeterminate 2.2 cm left suprarenal mass and no pneumothorax. CT maxillofacial showed acute nondisplaced fractures of the mandibular bodies bilaterally. The patient was transfered to ICU for continued care       24HRS EVENT:  Neuro: decreased mental status  Resp: resp distress on the floor. Upgraded for pulm toilet, duoned. CT abd chest chest pelvis. no PE   CVS; Hypertensive to 220---labetalol 10mg   GI: NPO. speech and swallow consult pending   Disposition: spoke to [facility] about advance direct no decision, waiting for family member to come in, try to schedule [facility] meeting this week. pt  full code,   : no marquez          DVT PTX:     GI PTX:    ***Tubes/Lines/Drains  ***  Peripheral IV  Central Venous Line     	Date   Arterial Line		                Date   [] PICC:         	[] Midline		[] Mediport             Urinary Catheter		Indication: Strict I&O    Date Placed:       REVIEW OF SYSTEMS    [X ] Due to altered mental status/intubation, subjective information were not able to be obtained from the patient. History was obtained, to the extent possible, from review of the chart and collateral sources of information.    Daily Height in cm: 167.64 (2018 10:00)    Daily Weight in k.6 (2018 04:00)    Diet, NPO:   Except Medications (18 @ 21:16)      CURRENT MEDS:  Neurologic Medications  acetaminophen   Tablet. 650 milliGRAM(s) Oral every 6 hours  escitalopram 5 milliGRAM(s) Oral daily  gabapentin 100 milliGRAM(s) Oral daily  levETIRAcetam  IVPB 500 milliGRAM(s) IV Intermittent every 12 hours  memantine 10 milliGRAM(s) Oral daily  morphine Concentrate 5 milliGRAM(s) Oral every 6 hours    Respiratory Medications  ALBUTerol/ipratropium for Nebulization 3 milliLiter(s) Nebulizer every 6 hours    Cardiovascular Medications  amLODIPine   Tablet 5 milliGRAM(s) Oral daily    Gastrointestinal Medications  pantoprazole  Injectable 40 milliGRAM(s) IV Push daily    Genitourinary Medications  oxybutynin 10 milliGRAM(s) Oral daily    Hematologic/Oncologic Medications  heparin  Injectable 5000 Unit(s) SubCutaneous every 8 hours  imatinib 400 milliGRAM(s) Oral daily    Antimicrobial/Immunologic Medications    Endocrine/Metabolic Medications    Topical/Other Medications      ICU Vital Signs Last 24 Hrs  T(C): 37.9 (2018 07:30), Max: 38.5 (2018 16:00)  T(F): 100.3 (2018 07:30), Max: 101.3 (2018 16:00)  HR: 100 (2018 07:30) (96 - 128)  BP: 156/63 (2018 07:30) (140/66 - 207/90)  BP(mean): 94 (2018 07:30) (82 - 148)  ABP: --  ABP(mean): --  RR: 16 (2018 07:30) (14 - 22)  SpO2: 99% (2018 07:30) (94% - 100%)        I&O's Summary    2018 07:  -  2018 07:00  --------------------------------------------------------  IN: 1347 mL / OUT: 0 mL / NET: 1347 mL      I&O's Detail    2018 07:  -  2018 07:00  --------------------------------------------------------  IN:    IV PiggyBack: 650 mL    Packed Red Blood Cells: 597 mL    Solution: 100 mL  Total IN: 1347 mL    OUT:  Total OUT: 0 mL    Total NET: 1347 mL          PHYSICAL EXAM:    General/Neuro: dementia at baseline.  not following commands but withdraw to pain  C. Collar in place   minima edema adjacent to mandible fx    Lungs:      clear to auscultation, Normal expansion/effort.     Cardiovascular : S1, S2.  Regular rate and rhythm.    Cardiac Rhythm:  Sinus Tachy    GI: Abdomen soft, Non-tender, Non-distended.      Extremities: left arm in splint... Pulses: palpable b/l     :       Marquez catheter in place.      CXR:     LABS:  CAPILLARY BLOOD GLUCOSE                              8.6    7.74  )-----------( 105      ( 2018 00:44 )             24.2       06-25    143  |  105  |  22<H>  ----------------------------<  129<H>  3.8   |  25  |  1.0    Ca    8.4<L>      2018 00:44  Phos  3.0     06-25  Mg     1.9     06-25        PT/INR - ( 2018 00:44 )   PT: 12.90 sec;   INR: 1.19 ratio         PTT - ( 2018 00:44 )  PTT:40.7 sec  CARDIAC MARKERS ( 2018 20:30 )  x     / 0.01 ng/mL / 394 U/L / x     / x

## 2018-06-26 LAB
ANION GAP SERPL CALC-SCNC: 10 MMOL/L — SIGNIFICANT CHANGE UP (ref 7–14)
ANION GAP SERPL CALC-SCNC: 14 MMOL/L — SIGNIFICANT CHANGE UP (ref 7–14)
BASE EXCESS BLDA CALC-SCNC: 2.5 MMOL/L — HIGH (ref -2–2)
BUN SERPL-MCNC: 29 MG/DL — HIGH (ref 10–20)
BUN SERPL-MCNC: 37 MG/DL — HIGH (ref 10–20)
CALCIUM SERPL-MCNC: 8.6 MG/DL — SIGNIFICANT CHANGE UP (ref 8.5–10.1)
CALCIUM SERPL-MCNC: 8.9 MG/DL — SIGNIFICANT CHANGE UP (ref 8.5–10.1)
CHLORIDE SERPL-SCNC: 105 MMOL/L — SIGNIFICANT CHANGE UP (ref 98–110)
CHLORIDE SERPL-SCNC: 106 MMOL/L — SIGNIFICANT CHANGE UP (ref 98–110)
CO2 SERPL-SCNC: 25 MMOL/L — SIGNIFICANT CHANGE UP (ref 17–32)
CO2 SERPL-SCNC: 28 MMOL/L — SIGNIFICANT CHANGE UP (ref 17–32)
CREAT SERPL-MCNC: 1.2 MG/DL — SIGNIFICANT CHANGE UP (ref 0.7–1.5)
CREAT SERPL-MCNC: 1.3 MG/DL — SIGNIFICANT CHANGE UP (ref 0.7–1.5)
GLUCOSE SERPL-MCNC: 146 MG/DL — HIGH (ref 70–99)
GLUCOSE SERPL-MCNC: 157 MG/DL — HIGH (ref 70–99)
GRAM STN FLD: SIGNIFICANT CHANGE UP
HCO3 BLDA-SCNC: 28 MMOL/L — HIGH (ref 23–27)
HCT VFR BLD CALC: 20.8 % — LOW (ref 37–47)
HCT VFR BLD CALC: 21.4 % — LOW (ref 37–47)
HCT VFR BLD CALC: 24.4 % — LOW (ref 37–47)
HGB BLD-MCNC: 7 G/DL — CRITICAL LOW (ref 12–16)
HGB BLD-MCNC: 7.3 G/DL — CRITICAL LOW (ref 12–16)
HGB BLD-MCNC: 8.2 G/DL — LOW (ref 12–16)
MAGNESIUM SERPL-MCNC: 1.8 MG/DL — SIGNIFICANT CHANGE UP (ref 1.8–2.4)
MCHC RBC-ENTMCNC: 31.6 PG — HIGH (ref 27–31)
MCHC RBC-ENTMCNC: 31.8 PG — HIGH (ref 27–31)
MCHC RBC-ENTMCNC: 32.3 PG — HIGH (ref 27–31)
MCHC RBC-ENTMCNC: 33.6 G/DL — SIGNIFICANT CHANGE UP (ref 32–37)
MCHC RBC-ENTMCNC: 33.7 G/DL — SIGNIFICANT CHANGE UP (ref 32–37)
MCHC RBC-ENTMCNC: 34.1 G/DL — SIGNIFICANT CHANGE UP (ref 32–37)
MCV RBC AUTO: 92.6 FL — SIGNIFICANT CHANGE UP (ref 81–99)
MCV RBC AUTO: 94.6 FL — SIGNIFICANT CHANGE UP (ref 81–99)
MCV RBC AUTO: 95.9 FL — SIGNIFICANT CHANGE UP (ref 81–99)
METHOD TYPE: SIGNIFICANT CHANGE UP
MSSA DNA SPEC QL NAA+PROBE: SIGNIFICANT CHANGE UP
NRBC # BLD: 0 /100 WBCS — SIGNIFICANT CHANGE UP (ref 0–0)
PCO2 BLDA: 50 MMHG — HIGH (ref 38–42)
PH BLDA: 7.36 — LOW (ref 7.38–7.42)
PHOSPHATE SERPL-MCNC: 3 MG/DL — SIGNIFICANT CHANGE UP (ref 2.1–4.9)
PLATELET # BLD AUTO: 107 K/UL — LOW (ref 130–400)
PLATELET # BLD AUTO: 118 K/UL — LOW (ref 130–400)
PLATELET # BLD AUTO: 120 K/UL — LOW (ref 130–400)
PO2 BLDA: 56 MMHG — LOW (ref 78–95)
POTASSIUM SERPL-MCNC: 3.6 MMOL/L — SIGNIFICANT CHANGE UP (ref 3.5–5)
POTASSIUM SERPL-MCNC: 4.3 MMOL/L — SIGNIFICANT CHANGE UP (ref 3.5–5)
POTASSIUM SERPL-SCNC: 3.6 MMOL/L — SIGNIFICANT CHANGE UP (ref 3.5–5)
POTASSIUM SERPL-SCNC: 4.3 MMOL/L — SIGNIFICANT CHANGE UP (ref 3.5–5)
RBC # BLD: 2.17 M/UL — LOW (ref 4.2–5.4)
RBC # BLD: 2.31 M/UL — LOW (ref 4.2–5.4)
RBC # BLD: 2.58 M/UL — LOW (ref 4.2–5.4)
RBC # FLD: 16.6 % — HIGH (ref 11.5–14.5)
SAO2 % BLDA: 89 % — LOW (ref 94–98)
SODIUM SERPL-SCNC: 144 MMOL/L — SIGNIFICANT CHANGE UP (ref 135–146)
SODIUM SERPL-SCNC: 144 MMOL/L — SIGNIFICANT CHANGE UP (ref 135–146)
SPECIMEN SOURCE: SIGNIFICANT CHANGE UP
WBC # BLD: 4.09 K/UL — LOW (ref 4.8–10.8)
WBC # BLD: 5.48 K/UL — SIGNIFICANT CHANGE UP (ref 4.8–10.8)
WBC # BLD: 8.2 K/UL — SIGNIFICANT CHANGE UP (ref 4.8–10.8)
WBC # FLD AUTO: 4.09 K/UL — LOW (ref 4.8–10.8)
WBC # FLD AUTO: 5.48 K/UL — SIGNIFICANT CHANGE UP (ref 4.8–10.8)
WBC # FLD AUTO: 8.2 K/UL — SIGNIFICANT CHANGE UP (ref 4.8–10.8)

## 2018-06-26 PROCEDURE — 99291 CRITICAL CARE FIRST HOUR: CPT

## 2018-06-26 RX ORDER — SODIUM CHLORIDE 9 MG/ML
250 INJECTION INTRAMUSCULAR; INTRAVENOUS; SUBCUTANEOUS ONCE
Qty: 0 | Refills: 0 | Status: COMPLETED | OUTPATIENT
Start: 2018-06-26 | End: 2018-06-26

## 2018-06-26 RX ORDER — CHLORHEXIDINE GLUCONATE 213 G/1000ML
15 SOLUTION TOPICAL
Qty: 0 | Refills: 0 | Status: DISCONTINUED | OUTPATIENT
Start: 2018-06-26 | End: 2018-07-02

## 2018-06-26 RX ORDER — MAGNESIUM SULFATE 500 MG/ML
2 VIAL (ML) INJECTION ONCE
Qty: 0 | Refills: 0 | Status: COMPLETED | OUTPATIENT
Start: 2018-06-26 | End: 2018-06-26

## 2018-06-26 RX ORDER — POTASSIUM CHLORIDE 20 MEQ
20 PACKET (EA) ORAL ONCE
Qty: 0 | Refills: 0 | Status: COMPLETED | OUTPATIENT
Start: 2018-06-26 | End: 2018-06-26

## 2018-06-26 RX ORDER — PANTOPRAZOLE SODIUM 20 MG/1
40 TABLET, DELAYED RELEASE ORAL DAILY
Qty: 0 | Refills: 0 | Status: DISCONTINUED | OUTPATIENT
Start: 2018-06-26 | End: 2018-06-29

## 2018-06-26 RX ORDER — DEXMEDETOMIDINE HYDROCHLORIDE IN 0.9% SODIUM CHLORIDE 4 UG/ML
0.1 INJECTION INTRAVENOUS
Qty: 200 | Refills: 0 | Status: DISCONTINUED | OUTPATIENT
Start: 2018-06-26 | End: 2018-06-27

## 2018-06-26 RX ADMIN — Medication 250 MILLIGRAM(S): at 05:44

## 2018-06-26 RX ADMIN — MORPHINE SULFATE 5 MILLIGRAM(S): 50 CAPSULE, EXTENDED RELEASE ORAL at 14:21

## 2018-06-26 RX ADMIN — HEPARIN SODIUM 5000 UNIT(S): 5000 INJECTION INTRAVENOUS; SUBCUTANEOUS at 05:42

## 2018-06-26 RX ADMIN — AMLODIPINE BESYLATE 5 MILLIGRAM(S): 2.5 TABLET ORAL at 05:43

## 2018-06-26 RX ADMIN — LEVETIRACETAM 420 MILLIGRAM(S): 250 TABLET, FILM COATED ORAL at 21:34

## 2018-06-26 RX ADMIN — MORPHINE SULFATE 5 MILLIGRAM(S): 50 CAPSULE, EXTENDED RELEASE ORAL at 06:11

## 2018-06-26 RX ADMIN — Medication 3 MILLILITER(S): at 13:12

## 2018-06-26 RX ADMIN — HEPARIN SODIUM 5000 UNIT(S): 5000 INJECTION INTRAVENOUS; SUBCUTANEOUS at 14:43

## 2018-06-26 RX ADMIN — CEFEPIME 100 MILLIGRAM(S): 1 INJECTION, POWDER, FOR SOLUTION INTRAMUSCULAR; INTRAVENOUS at 17:26

## 2018-06-26 RX ADMIN — Medication 10 MILLIGRAM(S): at 12:41

## 2018-06-26 RX ADMIN — Medication 650 MILLIGRAM(S): at 17:24

## 2018-06-26 RX ADMIN — Medication 650 MILLIGRAM(S): at 14:21

## 2018-06-26 RX ADMIN — Medication 3 MILLILITER(S): at 07:52

## 2018-06-26 RX ADMIN — Medication 25 GRAM(S): at 03:43

## 2018-06-26 RX ADMIN — Medication 650 MILLIGRAM(S): at 12:41

## 2018-06-26 RX ADMIN — MORPHINE SULFATE 5 MILLIGRAM(S): 50 CAPSULE, EXTENDED RELEASE ORAL at 17:40

## 2018-06-26 RX ADMIN — SODIUM CHLORIDE 1500 MILLILITER(S): 9 INJECTION INTRAMUSCULAR; INTRAVENOUS; SUBCUTANEOUS at 20:00

## 2018-06-26 RX ADMIN — LEVETIRACETAM 420 MILLIGRAM(S): 250 TABLET, FILM COATED ORAL at 09:52

## 2018-06-26 RX ADMIN — Medication 650 MILLIGRAM(S): at 00:01

## 2018-06-26 RX ADMIN — MORPHINE SULFATE 5 MILLIGRAM(S): 50 CAPSULE, EXTENDED RELEASE ORAL at 00:01

## 2018-06-26 RX ADMIN — DEXMEDETOMIDINE HYDROCHLORIDE IN 0.9% SODIUM CHLORIDE 1.77 MICROGRAM(S)/KG/HR: 4 INJECTION INTRAVENOUS at 22:15

## 2018-06-26 RX ADMIN — CEFEPIME 100 MILLIGRAM(S): 1 INJECTION, POWDER, FOR SOLUTION INTRAMUSCULAR; INTRAVENOUS at 05:44

## 2018-06-26 RX ADMIN — MORPHINE SULFATE 5 MILLIGRAM(S): 50 CAPSULE, EXTENDED RELEASE ORAL at 05:41

## 2018-06-26 RX ADMIN — Medication 650 MILLIGRAM(S): at 05:42

## 2018-06-26 RX ADMIN — Medication 650 MILLIGRAM(S): at 00:30

## 2018-06-26 RX ADMIN — MORPHINE SULFATE 5 MILLIGRAM(S): 50 CAPSULE, EXTENDED RELEASE ORAL at 00:30

## 2018-06-26 RX ADMIN — MORPHINE SULFATE 5 MILLIGRAM(S): 50 CAPSULE, EXTENDED RELEASE ORAL at 17:25

## 2018-06-26 RX ADMIN — Medication 650 MILLIGRAM(S): at 06:11

## 2018-06-26 RX ADMIN — Medication 50 MILLIEQUIVALENT(S): at 03:43

## 2018-06-26 RX ADMIN — Medication 200 MILLIGRAM(S): at 05:42

## 2018-06-26 RX ADMIN — Medication 200 MILLIGRAM(S): at 17:25

## 2018-06-26 RX ADMIN — Medication 250 MILLIGRAM(S): at 17:26

## 2018-06-26 RX ADMIN — MORPHINE SULFATE 5 MILLIGRAM(S): 50 CAPSULE, EXTENDED RELEASE ORAL at 12:40

## 2018-06-26 RX ADMIN — PANTOPRAZOLE SODIUM 40 MILLIGRAM(S): 20 TABLET, DELAYED RELEASE ORAL at 14:43

## 2018-06-26 RX ADMIN — ESCITALOPRAM OXALATE 5 MILLIGRAM(S): 10 TABLET, FILM COATED ORAL at 12:50

## 2018-06-26 RX ADMIN — HEPARIN SODIUM 5000 UNIT(S): 5000 INJECTION INTRAVENOUS; SUBCUTANEOUS at 21:34

## 2018-06-26 RX ADMIN — MEMANTINE HYDROCHLORIDE 10 MILLIGRAM(S): 10 TABLET ORAL at 12:41

## 2018-06-26 RX ADMIN — GABAPENTIN 100 MILLIGRAM(S): 400 CAPSULE ORAL at 12:41

## 2018-06-26 NOTE — SWALLOW BEDSIDE ASSESSMENT ADULT - NS ASR SWALLOW FINDINGS DISCUS
Nursing/RN Katja
RN Nicole and sx team/Nursing/Physician
Nursing/Surgery MANUELA Byrnes/Physician
Nursing

## 2018-06-26 NOTE — SWALLOW BEDSIDE ASSESSMENT ADULT - SWALLOW EVAL: RECOMMENDED DIET
NPO w/ alt means of nutrition/hydration
NPO with alternate means of nutrition and hydration
puree w/ nectar-thick liquids
NPO

## 2018-06-26 NOTE — PROGRESS NOTE ADULT - ASSESSMENT
78yFemale being evaluated for goals of care.    Family meeting held w Surgical team, Palliative Care and pt's 4 children. Clinical condition reviewed in detail, overall poor prognosis discussed. Family given options to pursue intubation given progressive hypoxia with continuation of IV Abx and hope for recovery versus comfort measures. Family decided to continue SICU management with intubation as described. If no improvement in 48hrs with re-consider comfort care options with vent withdrawal.     Family educated on suggestion of DNR status as cardiac resuscitation would likely impose a greater burden than medically benefit her at this time given her grave prognosis, chronic medical problems and extensive injuries.   Family agree to initiate DNR status.      Recommendations:  Initiate DNR status  continue further SICU management including trial of intubation  morphine PRN    We will follow

## 2018-06-26 NOTE — AIRWAY PLACEMENT NOTE ADULT - POST AIRWAY PLACEMENT ASSESSMENT:
breath sounds bilateral
positive end tidal CO2 noted/CXR pending/skin color improved/breath sounds equal/breath sounds bilateral

## 2018-06-26 NOTE — SWALLOW BEDSIDE ASSESSMENT ADULT - SLP GENERAL OBSERVATIONS
pt received in bed asleep +minimally arousable and in no apparent pain. +o2 via nc. +wet upper airway/breath sounds.

## 2018-06-26 NOTE — PROGRESS NOTE ADULT - SUBJECTIVE AND OBJECTIVE BOX
KYM ROTH  [id number]  78y Female    Indication for ICU admission: s/p fall   Admit Date:06-21-18  ICU Date:  OR Date:     HPI 79y/o F with PMHx of Dementia stage 4, presents for evaluation of possible fall, unknown onset and unknown LOC. The patient lives at home with daughter and home aide, last night she went to bed and was later heard crying for help next to her bed. The patient was taken to ED for evaluation of signs of trauma, left face hematoma, left shoulder pain, left wrist pain and left thigh hematoma. Head CT showed Small scattered left-sided subarachnoid hemorrhage but no significant mass effect. CT of cervical spine showed minimally displaced avulsion fracture of the anterior-inferior C2, vertebral body with mild widening of the anterior C2-C3 disc space, consistent with extension teardrop fracture. CT Chest with IV contrast showed acute fractures of the left posterior 10th and 11th, new indeterminate 2.2 cm left suprarenal mass and no pneumothorax. CT maxillofacial showed acute nondisplaced fractures of the mandibular bodies bilaterally. The patient was transfered to ICU for continued care       24HRS EVENT:  Neuro; baseline dementia stage 4, lethargic. Pain control on Tylenol and Morphine   Resp: worsening pleural effusion and LLL PNA on CXR- duonebs & pulm toiletry  CVS: hypertensive in the 200s, received Labatolol 10x1 and then started on 200mg q12 PO Troponin negative x1..  -DVT sono negative  GI: NPO except foe meds, Protonix PPI...failed speech and swallow   : UA positive for nitrite -- started on abx: cefepime. Blood cx results pending   Heme: H/H trending down from 9.2 to 8.6  ID:  abx Vanco for uti & Cefepime for pna  consult Paliative for family meeting to discuss goals of care @4pm tomorrow   PT consult:  Short Term Rehab in Skilled Nursing Facility OR Home with Outpatient or VN services  24X 7 CARE    At night: Awake, open eyes to voice, does not follow commands, vitals stable, did not void for 6 h, 30 cc in bladder. Started on 75 D5/1/12 NS+ 20 K. At night voided.   Hypoganesemia repleted with 2 g Mg, hypokalemis repleted with 20 KCL, BMP in am     DVT PTX: HSQ, SCD    GI PTX: protonix    ***Tubes/Lines/Drains  ***  Peripheral IV           Urinary Catheter		Indication: Strict I&O    Date Placed: 6/21      REVIEW OF SYSTEMS    [x ] A ten-point review of systems was otherwise negative except as noted. KYM ROTH  [id number]  78y Female    Indication for ICU admission: s/p fall   Admit Date:18  ICU Date:  OR Date:     HPI 79y/o F with PMHx of Dementia stage 4, presents for evaluation of possible fall, unknown onset and unknown LOC. The patient lives at home with daughter and home aide, last night she went to bed and was later heard crying for help next to her bed. The patient was taken to ED for evaluation of signs of trauma, left face hematoma, left shoulder pain, left wrist pain and left thigh hematoma. Head CT showed Small scattered left-sided subarachnoid hemorrhage but no significant mass effect. CT of cervical spine showed minimally displaced avulsion fracture of the anterior-inferior C2, vertebral body with mild widening of the anterior C2-C3 disc space, consistent with extension teardrop fracture. CT Chest with IV contrast showed acute fractures of the left posterior 10th and 11th, new indeterminate 2.2 cm left suprarenal mass and no pneumothorax. CT maxillofacial showed acute nondisplaced fractures of the mandibular bodies bilaterally.     The patient was re- upgraded to ICU  for AMS, repeat HCT was stable      24HRS EVENT:  Neuro; baseline dementia stage 4, lethargic. Pain control on Tylenol and Morphine   Resp: worsening pleural effusion and LLL PNA on CXR- duonebs & pulm toiletry  CVS: hypertensive in the 200s, received Labatolol 10x1 and then started on 200mg q12 PO Troponin negative x1..  DVT sono negative  GI: NPO except for meds, Protonix PPI...Seen by speech and swallow - not appropriate for evaluation    : UA positive for nitrite -- started on abx: cefepime. Blood cx results pending   Heme: H/H trending down from 9.2 to 8.6  ID:  abx Vanco for uti & Cefepime for pna  blood cultures  preliminary postive for gm postive cocci in both aerobic and anaerobic bottles  Paliative care  for family meeting to discuss goals of care @4pm tomorrow   PT consult:  Short Term Rehab in Skilled Nursing Facility OR Home with Outpatient or VN services  24X 7 CARE    At night: Awake, open eyes to voice, does not follow commands, vitals stable, did not void for 6 h, 30 cc in bladder. Started on 75 D5/1/12 NS+ 20 K. At night voided.   Hypomagnesemia repleted with 2 g Mg, hypokalemis repleted with 20 KCL, BMP in am     DVT PTX: HSQ, SCD    GI PTX: protonix    ***Tubes/Lines/Drains  ***  Peripheral IV          No Urban: Incontinent      REVIEW OF SYSTEMS    [x ] A ten-point review of systems was otherwise negative except as noted.    Daily Height in cm: 167.64 (2018 10:30)      Diet, NPO:   Except Medications (18 @ 14:57)      CURRENT MEDS:  Neurologic Medications  acetaminophen   Tablet. 650 milliGRAM(s) Oral every 6 hours  escitalopram 5 milliGRAM(s) Oral daily  gabapentin 100 milliGRAM(s) Oral daily  levETIRAcetam  IVPB 500 milliGRAM(s) IV Intermittent every 12 hours  memantine 10 milliGRAM(s) Oral daily  morphine Concentrate 5 milliGRAM(s) Oral every 6 hours    Respiratory Medications  ALBUTerol/ipratropium for Nebulization 3 milliLiter(s) Nebulizer every 6 hours    Cardiovascular Medications  amLODIPine   Tablet 5 milliGRAM(s) Oral daily  labetalol 200 milliGRAM(s) Oral two times a day    Gastrointestinal Medications  dextrose 5% + sodium chloride 0.45% with potassium chloride 20 mEq/L 1000 milliLiter(s) IV Continuous <Continuous>  pantoprazole  Injectable 40 milliGRAM(s) IV Push daily    Genitourinary Medications  oxybutynin 10 milliGRAM(s) Oral daily    Hematologic/Oncologic Medications  heparin  Injectable 5000 Unit(s) SubCutaneous every 8 hours  imatinib 400 milliGRAM(s) Oral daily    Antimicrobial/Immunologic Medications  cefepime   IVPB      cefepime   IVPB 500 milliGRAM(s) IV Intermittent every 12 hours  vancomycin  IVPB      vancomycin  IVPB 1000 milliGRAM(s) IV Intermittent every 12 hours      ICU Vital Signs Last 24 Hrs  T(C): 38 (2018 08:00), Max: 38.4 (2018 16:07)  T(F): 100.4 (2018 08:00), Max: 101.1 (2018 16:07)  HR: 78 (2018 08:00) (78 - 114)  BP: 96/46 (2018 08:00) (93/45 - 213/91)  BP(mean): 56 (2018 08:00) (56 - 155)  ABP: --  ABP(mean): --  RR: 14 (2018 08:) (12 - 34)  SpO2: 96% (2018 08:) (93% - 99%)      I&O's Summary    2018 07:  -  2018 07:00  --------------------------------------------------------  IN: 150 mL / OUT: 850 mL / NET: -700 mL      -  2018 08:20  --------------------------------------------------------  IN: 75 mL / OUT: 0 mL / NET: 75 mL      I&O's Detail      -  2018 07:00  --------------------------------------------------------  IN:    dextrose 5% + sodium chloride 0.45% with potassium chloride 20 mEq/L: 150 mL  Total IN: 150 mL    OUT:    Nasoenteral Tube: 200 mL    Post-Void Residual per Intermittent Catheterization: 200 mL    Voided: 450 mL  Total OUT: 850 mL    Total NET: -700 mL      2018 07:  -  2018 08:20  --------------------------------------------------------  IN:    dextrose 5% + sodium chloride 0.45% with potassium chloride 20 mEq/L: 75 mL  Total IN: 75 mL    OUT:  Total OUT: 0 mL    Total NET: 75 mL    PHYSICAL EXAM:    General/Neuro  GCS: 9=  E  1 / V 4  / M   4 (weakly withdraws)   Deficits:  baseline dementia, states doesn't remember , oriented to person only   Pupils:    Equal,     Lungs:      clear to auscultation, Poor expansion/effort.     Cardiovascular : S1, S2.  Regular rate and rhythm.  Peripheral edema   Cardiac Rhythm: Normal Sinus Rhythm    GI: Abdomen soft, Non-tender, Non-distended.     Nasogastric  feeding tube in place.     Extremities: Extremities warm, pink, well-perfused.     Derm: Good skin turgor, no skin breakdown.  Multiple areas of ecchymosisis about the face, hands and back.  Large left distal thigh hematoma    :    incontinent      LABS:                   7.3    8.20  )-----------( 107      ( 2018 00:52 )             21.4       06-    144  |  106  |  29<H>  ----------------------------<  146<H>  3.6   |  28  |  1.2    Ca    8.6      2018 00:52  Phos  3.0       Mg     1.8             PT/INR - ( 2018 00:44 )   PT: 12.90 sec;   INR: 1.19 ratio         PTT - ( 2018 00:44 )  PTT:40.7 sec  CARDIAC MARKERS ( 2018 12:26 )  x     / <0.01 ng/mL / x     / x     / x          Urinalysis Basic - ( 2018 09:54 )    Color: Yellow / Appearance: Cloudy / S.020 / pH: x  Gluc: x / Ketone: Negative  / Bili: Negative / Urobili: 1.0 mg/dL   Blood: x / Protein: 100 mg/dL / Nitrite: Positive   Leuk Esterase: Small / RBC: 2-5 /HPF / WBC 3-5 /HPF   Sq Epi: x / Non Sq Epi: x / Bacteria: Many /HPF        Culture - Blood (collected 2018 11:55)  Source: .Blood Blood  Gram Stain (2018 01:20):    Growth in aerobic and anaerobic bottles: Gram Positive Cocci in Clusters  Preliminary Report (2018 01:20):    Growth in aerobic and anaerobic bottles: Gram Positive Cocci in Clusters

## 2018-06-26 NOTE — SWALLOW BEDSIDE ASSESSMENT ADULT - SWALLOW EVAL: DIAGNOSIS
PO trials not appropriate at this time 2' pt lethargic, minimally arousable, poor attention to task
PO trials not appropriate at this time 2' to pt minimally arousable.
mild oral dysphagia for puree + nectar-thick liquids w/o overt s/s aspiration/penetration; pt refused additional PO trials of thin liquids
Severe oropharyngeal dysphagia

## 2018-06-26 NOTE — SWALLOW BEDSIDE ASSESSMENT ADULT - SLP PERTINENT HISTORY OF CURRENT PROBLEM
pt being treated for SAH s/p unwitnessed fall, multiple fractures upgraded for tachycardia and desaturation.

## 2018-06-26 NOTE — PROGRESS NOTE ADULT - ASSESSMENT
· Assessment		  78y Female s/p SAH, unwitness fall, multiple fractures upgraded for tachycardia and desaturating    PLAN:   Neurologic:  metabolic encpehalopathy in setting of severe dementia; ddx still includes stroke, developing infection  will pan-cx, cbc w/ diff, ua, start vanc cefe  baseline dementia; stage 4. Pain control with Tylenol. Neuro Checks Q1, on keppra, [place] J collar for c-2 fx.    Respiratory: on 4L NC. Duplex negative for PE    Cardiovascular: keep normotensive    Gastrointestinal/Nutrition: NPO. PPI. speech & Swallow to re-evaluate pt aspirated     Renal/Genitourinary:  marquez in, monitor I/O's     Hematologic: trend H/H, Hgb 7.3 (8.6, 9.2), transfuse if Hgb < 7 or symptomatic, repeat CBC in am    Infectious Disease: no abx     Endocrine: none    family meeting today · Assessment		  78y Female s/p SAH, unwitness fall, multiple fractures upgraded for tachycardia and desaturating    PLAN:   Neurologic:  metabolic encphehalopathy in setting of severe dementia;   baseline dementia; stage 4. Pain control with Tylenol. Neuro Checks Q1, on keppra, [place] J collar for c-2 fx.  Bacteremia- preliminary blood cultures postive gm postive cocci    Respiratory: on 4L NC. Duplex negative for VTE    Cardiovascular: keep normotensive    Gastrointestinal/Nutrition: NPO. PPI.  Still not appropriate for speech & Swallow evaluation    Renal/Genitourinary:  Incontinent  monitor I/O's     Hematologic: trend H/H, Hgb 7.3 (8.6, 9.2), transfuse if Hgb >7 7 or symptomatic, repeat CBC in am    Infectious Disease: no abx     Endocrine: none    family meeting today · Assessment		  78y Female s/p SAH, unwitness fall, multiple fractures upgraded for tachycardia and desaturating    PLAN:   Neurologic:  metabolic encphehalopathy in setting of severe dementia, likely due to sepsis from uti;   baseline dementia; stage 4. Pain control with Tylenol. Neuro Checks Q1, on keppra, [place] J collar for c-2 fx.  Bacteremia- preliminary blood cultures postive gm postive cocci    Respiratory: on 4L NC. Duplex negative for VTE    Cardiovascular: keep normotensive    Gastrointestinal/Nutrition: NPO. PPI.  Still not appropriate for speech & Swallow evaluation    Renal/Genitourinary:  Incontinent  monitor I/O's     Hematologic: trend H/H, Hgb 7.3 (8.6, 9.2), transfuse if Hgb <7 or symptomatic, repeat CBC in am    Infectious Disease: vanc cefe for uti and associated bacteremia    Endocrine: none    family meeting today

## 2018-06-26 NOTE — PROGRESS NOTE ADULT - SUBJECTIVE AND OBJECTIVE BOX
78yFemale with diagnosis: SUBARACHNOID BLEED; FRACTURE OF CERVICAL SPINE WITHOUT SPINAL CORD LEASION      Patient seen, clinically declining, hypoxic, unresponsive, ?septic. No noted pain/distress.      PHYSICAL EXAM elderly female, s/p mutliple trachristinemas    T(C): , Max: 38.3 (22:00)  T(F): 100  HR: 92 (78 - 102)  BP: 113/47 (93/45 - 158/57)  RR: 15 (12 - 34)  SpO2: 100% (89% - 100%)              LABS:                          8.2    5.48  )-----------( 120      ( 26 Jun 2018 11:47 )             24.4                                                                                      06-26    144  |  105  |  37<H>  ----------------------------<  157<H>  4.3   |  25  |  1.3    Ca    8.9      26 Jun 2018 11:47  Phos  3.0     06-26  Mg     1.8     06-26                                                        MEDICATIONS  (STANDING):  acetaminophen   Tablet. 650 milliGRAM(s) Oral every 6 hours  ALBUTerol/ipratropium for Nebulization 3 milliLiter(s) Nebulizer every 6 hours  amLODIPine   Tablet 5 milliGRAM(s) Oral daily  cefepime   IVPB      cefepime   IVPB 500 milliGRAM(s) IV Intermittent every 12 hours  dexmedetomidine Infusion 0.1 MICROgram(s)/kG/Hr (1.775 mL/Hr) IV Continuous <Continuous>  dextrose 5% + sodium chloride 0.45% with potassium chloride 20 mEq/L 1000 milliLiter(s) (75 mL/Hr) IV Continuous <Continuous>  escitalopram 5 milliGRAM(s) Oral daily  gabapentin 100 milliGRAM(s) Oral daily  heparin  Injectable 5000 Unit(s) SubCutaneous every 8 hours  imatinib 400 milliGRAM(s) Oral daily  labetalol 200 milliGRAM(s) Oral two times a day  levETIRAcetam  IVPB 500 milliGRAM(s) IV Intermittent every 12 hours  memantine 10 milliGRAM(s) Oral daily  morphine Concentrate 5 milliGRAM(s) Oral every 6 hours  oxybutynin 10 milliGRAM(s) Oral daily  pantoprazole   Suspension 40 milliGRAM(s) Oral daily  vancomycin  IVPB      vancomycin  IVPB 1000 milliGRAM(s) IV Intermittent every 12 hours    MEDICATIONS  (PRN):

## 2018-06-27 LAB
-  AMIKACIN: SIGNIFICANT CHANGE UP
-  AMOXICILLIN/CLAVULANIC ACID: SIGNIFICANT CHANGE UP
-  AMPICILLIN/SULBACTAM: SIGNIFICANT CHANGE UP
-  AMPICILLIN/SULBACTAM: SIGNIFICANT CHANGE UP
-  AMPICILLIN: SIGNIFICANT CHANGE UP
-  AZTREONAM: SIGNIFICANT CHANGE UP
-  CEFAZOLIN: SIGNIFICANT CHANGE UP
-  CEFAZOLIN: SIGNIFICANT CHANGE UP
-  CEFEPIME: SIGNIFICANT CHANGE UP
-  CEFOXITIN: SIGNIFICANT CHANGE UP
-  CEFTRIAXONE: SIGNIFICANT CHANGE UP
-  CIPROFLOXACIN: SIGNIFICANT CHANGE UP
-  CLINDAMYCIN: SIGNIFICANT CHANGE UP
-  ERTAPENEM: SIGNIFICANT CHANGE UP
-  ERYTHROMYCIN: SIGNIFICANT CHANGE UP
-  GENTAMICIN: SIGNIFICANT CHANGE UP
-  GENTAMICIN: SIGNIFICANT CHANGE UP
-  IMIPENEM: SIGNIFICANT CHANGE UP
-  LEVOFLOXACIN: SIGNIFICANT CHANGE UP
-  MEROPENEM: SIGNIFICANT CHANGE UP
-  NITROFURANTOIN: SIGNIFICANT CHANGE UP
-  OXACILLIN: SIGNIFICANT CHANGE UP
-  PENICILLIN: SIGNIFICANT CHANGE UP
-  PIPERACILLIN/TAZOBACTAM: SIGNIFICANT CHANGE UP
-  RIFAMPIN: SIGNIFICANT CHANGE UP
-  TETRACYCLINE: SIGNIFICANT CHANGE UP
-  TIGECYCLINE: SIGNIFICANT CHANGE UP
-  TOBRAMYCIN: SIGNIFICANT CHANGE UP
-  TRIMETHOPRIM/SULFAMETHOXAZOLE: SIGNIFICANT CHANGE UP
-  TRIMETHOPRIM/SULFAMETHOXAZOLE: SIGNIFICANT CHANGE UP
-  VANCOMYCIN: SIGNIFICANT CHANGE UP
ALBUMIN SERPL ELPH-MCNC: 2.7 G/DL — LOW (ref 3.5–5.2)
ALP SERPL-CCNC: 62 U/L — SIGNIFICANT CHANGE UP (ref 30–115)
ALT FLD-CCNC: 22 U/L — SIGNIFICANT CHANGE UP (ref 0–41)
ANION GAP SERPL CALC-SCNC: 12 MMOL/L — SIGNIFICANT CHANGE UP (ref 7–14)
AST SERPL-CCNC: 26 U/L — SIGNIFICANT CHANGE UP (ref 0–41)
BILIRUB SERPL-MCNC: 4.1 MG/DL — HIGH (ref 0.2–1.2)
BUN SERPL-MCNC: 45 MG/DL — HIGH (ref 10–20)
CALCIUM SERPL-MCNC: 8.6 MG/DL — SIGNIFICANT CHANGE UP (ref 8.5–10.1)
CHLORIDE SERPL-SCNC: 104 MMOL/L — SIGNIFICANT CHANGE UP (ref 98–110)
CO2 SERPL-SCNC: 23 MMOL/L — SIGNIFICANT CHANGE UP (ref 17–32)
CREAT SERPL-MCNC: 1.5 MG/DL — SIGNIFICANT CHANGE UP (ref 0.7–1.5)
CULTURE RESULTS: SIGNIFICANT CHANGE UP
CULTURE RESULTS: SIGNIFICANT CHANGE UP
GLUCOSE SERPL-MCNC: 210 MG/DL — HIGH (ref 70–99)
HCT VFR BLD CALC: 20.8 % — LOW (ref 37–47)
HGB BLD-MCNC: 6.8 G/DL — CRITICAL LOW (ref 12–16)
MAGNESIUM SERPL-MCNC: 2.1 MG/DL — SIGNIFICANT CHANGE UP (ref 1.8–2.4)
MCHC RBC-ENTMCNC: 31.2 PG — HIGH (ref 27–31)
MCHC RBC-ENTMCNC: 32.7 G/DL — SIGNIFICANT CHANGE UP (ref 32–37)
MCV RBC AUTO: 95.4 FL — SIGNIFICANT CHANGE UP (ref 81–99)
METHOD TYPE: SIGNIFICANT CHANGE UP
METHOD TYPE: SIGNIFICANT CHANGE UP
NRBC # BLD: 0 /100 WBCS — SIGNIFICANT CHANGE UP (ref 0–0)
ORGANISM # SPEC MICROSCOPIC CNT: SIGNIFICANT CHANGE UP
PHOSPHATE SERPL-MCNC: 2.2 MG/DL — SIGNIFICANT CHANGE UP (ref 2.1–4.9)
PLATELET # BLD AUTO: 122 K/UL — LOW (ref 130–400)
POTASSIUM SERPL-MCNC: 4.4 MMOL/L — SIGNIFICANT CHANGE UP (ref 3.5–5)
POTASSIUM SERPL-SCNC: 4.4 MMOL/L — SIGNIFICANT CHANGE UP (ref 3.5–5)
PROT SERPL-MCNC: 4.7 G/DL — LOW (ref 6–8)
RBC # BLD: 2.18 M/UL — LOW (ref 4.2–5.4)
RBC # FLD: 16.2 % — HIGH (ref 11.5–14.5)
SODIUM SERPL-SCNC: 139 MMOL/L — SIGNIFICANT CHANGE UP (ref 135–146)
SPECIMEN SOURCE: SIGNIFICANT CHANGE UP
SPECIMEN SOURCE: SIGNIFICANT CHANGE UP
VANCOMYCIN FLD-MCNC: 18.7 UG/ML — HIGH (ref 5–10)
WBC # BLD: 6.54 K/UL — SIGNIFICANT CHANGE UP (ref 4.8–10.8)
WBC # FLD AUTO: 6.54 K/UL — SIGNIFICANT CHANGE UP (ref 4.8–10.8)

## 2018-06-27 PROCEDURE — 99291 CRITICAL CARE FIRST HOUR: CPT

## 2018-06-27 RX ORDER — SODIUM CHLORIDE 9 MG/ML
250 INJECTION INTRAMUSCULAR; INTRAVENOUS; SUBCUTANEOUS ONCE
Qty: 0 | Refills: 0 | Status: COMPLETED | OUTPATIENT
Start: 2018-06-27 | End: 2018-06-27

## 2018-06-27 RX ADMIN — PANTOPRAZOLE SODIUM 40 MILLIGRAM(S): 20 TABLET, DELAYED RELEASE ORAL at 14:29

## 2018-06-27 RX ADMIN — SODIUM CHLORIDE 1000 MILLILITER(S): 9 INJECTION INTRAMUSCULAR; INTRAVENOUS; SUBCUTANEOUS at 14:10

## 2018-06-27 RX ADMIN — SODIUM CHLORIDE 500 MILLILITER(S): 9 INJECTION INTRAMUSCULAR; INTRAVENOUS; SUBCUTANEOUS at 03:45

## 2018-06-27 RX ADMIN — CHLORHEXIDINE GLUCONATE 15 MILLILITER(S): 213 SOLUTION TOPICAL at 17:49

## 2018-06-27 RX ADMIN — LEVETIRACETAM 420 MILLIGRAM(S): 250 TABLET, FILM COATED ORAL at 11:53

## 2018-06-27 RX ADMIN — Medication 10 MILLIGRAM(S): at 11:54

## 2018-06-27 RX ADMIN — LEVETIRACETAM 420 MILLIGRAM(S): 250 TABLET, FILM COATED ORAL at 21:02

## 2018-06-27 RX ADMIN — Medication 650 MILLIGRAM(S): at 00:09

## 2018-06-27 RX ADMIN — Medication 650 MILLIGRAM(S): at 05:37

## 2018-06-27 RX ADMIN — Medication 650 MILLIGRAM(S): at 05:07

## 2018-06-27 RX ADMIN — Medication 650 MILLIGRAM(S): at 14:10

## 2018-06-27 RX ADMIN — Medication 650 MILLIGRAM(S): at 23:40

## 2018-06-27 RX ADMIN — HEPARIN SODIUM 5000 UNIT(S): 5000 INJECTION INTRAVENOUS; SUBCUTANEOUS at 21:02

## 2018-06-27 RX ADMIN — Medication 250 MILLIGRAM(S): at 17:50

## 2018-06-27 RX ADMIN — HEPARIN SODIUM 5000 UNIT(S): 5000 INJECTION INTRAVENOUS; SUBCUTANEOUS at 05:07

## 2018-06-27 RX ADMIN — Medication 250 MILLIGRAM(S): at 05:08

## 2018-06-27 RX ADMIN — CEFEPIME 100 MILLIGRAM(S): 1 INJECTION, POWDER, FOR SOLUTION INTRAMUSCULAR; INTRAVENOUS at 05:08

## 2018-06-27 RX ADMIN — HEPARIN SODIUM 5000 UNIT(S): 5000 INJECTION INTRAVENOUS; SUBCUTANEOUS at 14:30

## 2018-06-27 RX ADMIN — MEMANTINE HYDROCHLORIDE 10 MILLIGRAM(S): 10 TABLET ORAL at 11:54

## 2018-06-27 RX ADMIN — CHLORHEXIDINE GLUCONATE 15 MILLILITER(S): 213 SOLUTION TOPICAL at 05:08

## 2018-06-27 RX ADMIN — Medication 650 MILLIGRAM(S): at 17:49

## 2018-06-27 RX ADMIN — Medication 650 MILLIGRAM(S): at 18:15

## 2018-06-27 RX ADMIN — GABAPENTIN 100 MILLIGRAM(S): 400 CAPSULE ORAL at 11:54

## 2018-06-27 RX ADMIN — CEFEPIME 100 MILLIGRAM(S): 1 INJECTION, POWDER, FOR SOLUTION INTRAMUSCULAR; INTRAVENOUS at 17:49

## 2018-06-27 RX ADMIN — ESCITALOPRAM OXALATE 5 MILLIGRAM(S): 10 TABLET, FILM COATED ORAL at 11:56

## 2018-06-27 RX ADMIN — Medication 650 MILLIGRAM(S): at 11:53

## 2018-06-27 NOTE — PROGRESS NOTE ADULT - SUBJECTIVE AND OBJECTIVE BOX
KYM ROTH  [id number]  78y Female    Indication for ICU admission: s/p fall   Admit Date:06-21-18      HPI 79y/o F with PMHx of Dementia stage 4, presents for evaluation of possible fall, unknown onset and unknown LOC. The patient lives at home with daughter and home aide, last night she went to bed and was later heard crying for help next to her bed. The patient was taken to ED for evaluation of signs of trauma, left face hematoma, left shoulder pain, left wrist pain and left thigh hematoma. Head CT showed Small scattered left-sided subarachnoid hemorrhage but no significant mass effect. CT of cervical spine showed minimally displaced avulsion fracture of the anterior-inferior C2, vertebral body with mild widening of the anterior C2-C3 disc space, consistent with extension teardrop fracture. CT Chest with IV contrast showed acute fractures of the left posterior 10th and 11th, new indeterminate 2.2 cm left suprarenal mass and no pneumothorax. CT maxillofacial showed acute nondisplaced fractures of the mandibular bodies bilaterally. The patient was transfered to ICU for continued care       24HRS EVENT:  HD #9  Neuro; baseline dementia stage 4, lethargic. Pain control on Tylenol and Morphine   Resp: worsening pleural effusion and LLL PNA on CXR- duonebs & pulm toiletry started on Vanc  -in afternoon, O2 sat was 86%, put on facemask, ABG at the time 7.36, 50, 56, 28, 89%, LA 1.4. Was intubated as per family decision  CVS: Labatolol 200mg q12 PO   GI: NPO except for meds, Protonix PPI...failed speech and swallow, NGT in place, TF started advanced to goal 60cc/h  : UA positive for nitrite -- started on abx: cefepime. Blood cx results pending   -was not voiding during the day, bladder scan showed >300, marquez inserted 250 output, u/o is red tinged.   Heme: H/H  9.2>8.6>7.3>8.2, no transfusions at this time  ID:  Vanc/cefepime for UTI/PNA  -6/25 blood cx- gram + cocci in clusters in both bottles  -6/25 urine cx- pending    Family meeting held with Palliative- decided make pt DNR but not DNI, request to watch pt for 24-48 hrs and if not getting better family will reconvene.     PT consult:  Short Term Rehab in Skilled Nursing Facility OR Home with Outpatient or VN services  24X 7 CARE    At night: Vitals stable, amlodipine and lisinopril held in am , received 250 bolus x 1 for low UOP, now 20 cc/h. Repeat labs pending at 430, phlebotomy could not draw labs at 2330.        DVT PTX: HSQ, SCD    GI PTX: protonix    ***Tubes/Lines/Drains  ***  Peripheral IV           Urinary Catheter		Indication: Strict I&O    Date Placed: 6/21      REVIEW OF SYSTEMS    [x ] A ten-point review of systems was otherwise negative except as noted. KYM ROTH  [id number]  78y Female    Indication for ICU admission: s/p fall   Admit Date:18      HPI 79y/o F with PMHx of Dementia stage 4, presents for evaluation of possible fall, unknown onset and unknown LOC. The patient lives at home with daughter and home aide, last night she went to bed and was later heard crying for help next to her bed. The patient was taken to ED for evaluation of signs of trauma, left face hematoma, left shoulder pain, left wrist pain and left thigh hematoma. Head CT showed Small scattered left-sided subarachnoid hemorrhage but no significant mass effect. CT of cervical spine showed minimally displaced avulsion fracture of the anterior-inferior C2, vertebral body with mild widening of the anterior C2-C3 disc space, consistent with extension teardrop fracture. CT Chest with IV contrast showed acute fractures of the left posterior 10th and 11th, new indeterminate 2.2 cm left suprarenal mass and no pneumothorax. CT maxillofacial showed acute nondisplaced fractures of the mandibular bodies bilaterally. The patient was transfered to ICU for continued care       24HRS EVENT:  HD #9  Neuro; baseline dementia stage 4, lethargic. Pain control on Tylenol and Morphine   Resp: worsening pleural effusion and LLL PNA on CXR- duonebs & pulm toiletry started on Vanc  -in afternoon, O2 sat was 86%, put on facemask, ABG at the time 7.36, 50, 56, 28, 89%, LA 1.4. Was intubated as per family decision  CVS: Labatolol 200mg q12 PO   GI: NPO except for meds, Protonix PPI...failed speech and swallow, NGT in place, TF started advanced to goal 60cc/h  : UA positive for nitrite -- started on abx: cefepime. Blood cx results pending   -was not voiding during the day, bladder scan showed >300, marquez inserted 250 output, u/o is red tinged.   Heme: H/H  9.2>8.6>7.3>8.2, no transfusions at this time  ID:  Vanc/cefepime for UTI/PNA  - blood cx- gram + cocci in clusters in both bottles  - urine cx- pending    Family meeting held with Palliative- decided make pt DNR but not DNI, request to watch pt for 24-48 hrs and if not getting better family will reconvene.     PT consult:  Short Term Rehab in Skilled Nursing Facility OR Home with Outpatient or VN services  24X 7 CARE    At night: Vitals stable, amlodipine and lisinopril held in am , received 250 bolus x 1 for low UOP, now 20 cc/h. Repeat labs pending at 430, phlebotomy could not draw labs at 2330.        DVT PTX: HSQ, SCD    GI PTX: protonix    ***Tubes/Lines/Drains  ***  Peripheral IV           Urinary Catheter		Indication: Strict I&O    Date Placed:     Daily     Daily     Diet, NPO with Tube Feed:   Tube Feeding Modality: Nasogastric  Osmolite 1.5 Ashu  Total Volume for 24 Hours (mL): 1440  Continuous  Starting Tube Feed Rate mL per Hour: 10  Increase Tube Feed Rate by (mL): 20     Every 4 hours  Until Goal Tube Feed Rate (mL per Hour): 60  Tube Feed Duration (in Hours): 24  Tube Feed Start Time: 18:00 (18 @ 17:53)      CURRENT MEDS:  Neurologic Medications  acetaminophen   Tablet. 650 milliGRAM(s) Oral every 6 hours  dexmedetomidine Infusion 0.1 MICROgram(s)/kG/Hr IV Continuous <Continuous>  escitalopram 5 milliGRAM(s) Oral daily  gabapentin 100 milliGRAM(s) Oral daily  levETIRAcetam  IVPB 500 milliGRAM(s) IV Intermittent every 12 hours  memantine 10 milliGRAM(s) Oral daily    Respiratory Medications  ALBUTerol/ipratropium for Nebulization 3 milliLiter(s) Nebulizer every 6 hours    Cardiovascular Medications  amLODIPine   Tablet 5 milliGRAM(s) Oral daily  labetalol 200 milliGRAM(s) Oral two times a day    Gastrointestinal Medications  dextrose 5% + sodium chloride 0.45% with potassium chloride 20 mEq/L 1000 milliLiter(s) IV Continuous <Continuous>  pantoprazole   Suspension 40 milliGRAM(s) Oral daily  sodium chloride 0.9% Bolus 250 milliLiter(s) IV Bolus once    Genitourinary Medications  oxybutynin 10 milliGRAM(s) Oral daily    Hematologic/Oncologic Medications  heparin  Injectable 5000 Unit(s) SubCutaneous every 8 hours  imatinib 400 milliGRAM(s) Oral daily    Antimicrobial/Immunologic Medications  cefepime   IVPB      cefepime   IVPB 500 milliGRAM(s) IV Intermittent every 12 hours  vancomycin  IVPB      vancomycin  IVPB 1000 milliGRAM(s) IV Intermittent every 12 hours    Endocrine/Metabolic Medications    Topical/Other Medications  chlorhexidine 0.12% Liquid 15 milliLiter(s) Swish and Spit two times a day      ICU Vital Signs Last 24 Hrs  T(C): 37.6 (2018 04:00), Max: 38.1 (2018 00:00)  T(F): 99.7 (2018 04:00), Max: 100.5 (2018 00:00)  HR: 82 (2018 07:00) (78 - 106)  BP: 87/39 (2018 07:00) (87/39 - 165/72)  BP(mean): 53 (:00) (53 - 108)  ABP: --  ABP(mean): --  RR: 15 (2018 07:00) (13 - 28)  SpO2: 98% (:) (86% - 100%)        Mode: AC/ CMV (Assist Control/ Continuous Mandatory Ventilation)  RR (machine): 16  TV (machine): 400  FiO2: 60  PEEP: 8  ITime: 1  MAP: 13  PIP: 23    ABG - ( 2018 03:59 )  pH, Arterial: 7.41  pH, Blood: x     /  pCO2: 40    /  pO2: 201   / HCO3: 25    / Base Excess: 0.3   /  SaO2: 100                 I&O's Summary    2018 07:  -  2018 07:00  --------------------------------------------------------  IN: 3438.2 mL / OUT: 709 mL / NET: 2729.2 mL      I&O's Detail    2018 07:  -  2018 07:00  --------------------------------------------------------  IN:    dexmedetomidine Infusion: 38.2 mL    dextrose 5% + sodium chloride 0.45% with potassium chloride 20 mEq/L: 1800 mL    IV PiggyBack: 400 mL    Osmolite: 600 mL    Sodium Chloride 0.9% IV Bolus: 500 mL    Solution: 100 mL  Total IN: 3438.2 mL    OUT:    Indwelling Catheter - Urethral: 559 mL    Nasoenteral Tube: 150 mL  Total OUT: 709 mL    Total NET: 2729.2 mL          PHYSICAL EXAM:    General/Neuro: intubated, sedated       Lungs:  clear to auscultation, Normal expansion/effort.     Cardiovascular : S1, S2.  Regular rate and rhythm.  Cardiac Rhythm: Normal Sinus Rhythm    GI: Abdomen soft, Non-tender, Non-distended.    Gastrostomy / Jejunostomy tube in place.  Nasogastric tube in place.  Colostomy / Ileostomy.    Wound:    Extremities: Extremities warm, pink, well-perfused. Pulses:Rt     Lt    Derm: Good skin turgor, no skin breakdown.      :       Marquez catheter in place.      CXR:     LABS:  CAPILLARY BLOOD GLUCOSE                              6.8    6.54  )-----------( 122      ( 2018 04:34 )             20.8           139  |  104  |  45<H>  ----------------------------<  210<H>  4.4   |  23  |  1.5    Ca    8.6      2018 04:34  Phos  2.2       Mg     2.1         TPro  4.7<L>  /  Alb  2.7<L>  /  TBili  4.1<H>  /  DBili  x   /  AST  26  /  ALT  22  /  AlkPhos  62  -        CARDIAC MARKERS ( 2018 12:26 )  x     / <0.01 ng/mL / x     / x     / x          Urinalysis Basic - ( 2018 09:54 )    Color: Yellow / Appearance: Cloudy / S.020 / pH: x  Gluc: x / Ketone: Negative  / Bili: Negative / Urobili: 1.0 mg/dL   Blood: x / Protein: 100 mg/dL / Nitrite: Positive   Leuk Esterase: Small / RBC: 2-5 /HPF / WBC 3-5 /HPF   Sq Epi: x / Non Sq Epi: x / Bacteria: Many /HPF        Culture - Urine (collected 2018 15:21)  Source: .Urine Catheterized  Preliminary Report (2018 17:39):    >100,000 CFU/ml Escherichia coli    Culture - Blood (collected 2018 11:55)  Source: .Blood Blood  Gram Stain (2018 01:20):    Growth in aerobic and anaerobic bottles: Gram Positive Cocci in Clusters  Preliminary Report (2018 18:04):    Growth in aerobic and anaerobic bottles: Staphylococcus aureus    "Due to technical problems, Proteus sp. will Not be reported as part of    the BCID panel until further notice"    ***Blood Panel PCR results on this specimen are available    approximately 3 hours after the Gram stain result.***    Gram stain, PCR, and/or culture results may not always    correspond due to difference in methodologies.    ************************************************************    This PCR assay was performed using [employer]    The following targets are tested for: Enterococcus,    vancomycin resistant enterococci, Listeria monocytogenes,    coagulase negative staphylococci, S. aureus,    methicillin resistant S. aureus, Streptococcus agalactiae    (Group B), S. pneumoniae, S. pyogenes (Group A),    Acinetobacter baumannii, Enterobacter cloacae, E. coli,    Klebsiella oxytoca, K. pneumoniae, Proteus sp.,    Serratia marcescens, Haemophilus influenzae,    Neisseria meningitidis, Pseudomonas aeruginosa, Candida    albicans, C. glabrata, C krusei, C parapsilosis,    C. tropicalis and the KPC resistance gene.  Organism: Blood Culture PCR (2018 02:46)  Organism: Blood Culture PCR (2018 02:46)            REVIEW OF SYSTEMS    [x ] A ten-point review of systems was otherwise negative except as noted. KYM ROTH  [id number]  78y Female    Indication for ICU admission: s/p fall   Admit Date:18      HPI 77y/o F with PMHx of Dementia stage 4, presents for evaluation of possible fall, unknown onset and unknown LOC. The patient lives at home with daughter and home aide, last night she went to bed and was later heard crying for help next to her bed. The patient was taken to ED for evaluation of signs of trauma, left face hematoma, left shoulder pain, left wrist pain and left thigh hematoma. Head CT showed Small scattered left-sided subarachnoid hemorrhage but no significant mass effect. CT of cervical spine showed minimally displaced avulsion fracture of the anterior-inferior C2, vertebral body with mild widening of the anterior C2-C3 disc space, consistent with extension teardrop fracture. CT Chest with IV contrast showed acute fractures of the left posterior 10th and 11th, new indeterminate 2.2 cm left suprarenal mass and no pneumothorax. CT maxillofacial showed acute nondisplaced fractures of the mandibular bodies bilaterally. The patient was transfered to ICU for continued care       24HRS EVENT:  HD #9  Neuro; baseline dementia stage 4, lethargic. Pain control on Tylenol and Morphine   Resp: worsening pleural effusion and LLL PNA on CXR- duonebs & pulm toiletry started on Vanc  -in afternoon, O2 sat was 86%, put on facemask, ABG at the time 7.36, 50, 56, 28, 89%, LA 1.4. Was intubated as per family decision  CVS: Labatolol 200mg q12 PO   GI: NPO except for meds, Protonix PPI...failed speech and swallow, NGT in place, TF started advanced to goal 60cc/h  : UA positive for nitrite -- started on abx: cefepime. Blood cx results pending   -was not voiding during the day, bladder scan showed >300, marquez inserted 250 output, u/o is red tinged.   Heme: H/H  9.2>8.6>7.3>8.2, no transfusions at this time  ID:  Vanc/cefepime for UTI/PNA  - blood cx- gram + cocci in clusters in both bottles  - urine cx- pending    Family meeting held with Palliative- decided make pt DNR but not DNI, request to watch pt for 24-48 hrs and if not getting better family will reconvene.     PT consult:  Short Term Rehab in Skilled Nursing Facility OR Home with Outpatient or VN services  24X 7 CARE    At night: Vitals stable, amlodipine and lisinopril held in am , received 250 bolus x 1 for low UOP, now 20 cc/h. Repeat labs pending at 430, phlebotomy could not draw labs at 2330.        DVT PTX: HSQ, SCD    GI PTX: protonix    ***Tubes/Lines/Drains  ***  Peripheral IV           Urinary Catheter		Indication: Strict I&O    Date Placed:     Daily     Daily     Diet, NPO with Tube Feed:   Tube Feeding Modality: Nasogastric  Osmolite 1.5 Ashu  Total Volume for 24 Hours (mL): 1440  Continuous  Starting Tube Feed Rate mL per Hour: 10  Increase Tube Feed Rate by (mL): 20     Every 4 hours  Until Goal Tube Feed Rate (mL per Hour): 60  Tube Feed Duration (in Hours): 24  Tube Feed Start Time: 18:00 (18 @ 17:53)      CURRENT MEDS:  Neurologic Medications  acetaminophen   Tablet. 650 milliGRAM(s) Oral every 6 hours  dexmedetomidine Infusion 0.1 MICROgram(s)/kG/Hr IV Continuous <Continuous>  escitalopram 5 milliGRAM(s) Oral daily  gabapentin 100 milliGRAM(s) Oral daily  levETIRAcetam  IVPB 500 milliGRAM(s) IV Intermittent every 12 hours  memantine 10 milliGRAM(s) Oral daily    Respiratory Medications  ALBUTerol/ipratropium for Nebulization 3 milliLiter(s) Nebulizer every 6 hours    Cardiovascular Medications  amLODIPine   Tablet 5 milliGRAM(s) Oral daily  labetalol 200 milliGRAM(s) Oral two times a day    Gastrointestinal Medications  dextrose 5% + sodium chloride 0.45% with potassium chloride 20 mEq/L 1000 milliLiter(s) IV Continuous <Continuous>  pantoprazole   Suspension 40 milliGRAM(s) Oral daily  sodium chloride 0.9% Bolus 250 milliLiter(s) IV Bolus once    Genitourinary Medications  oxybutynin 10 milliGRAM(s) Oral daily    Hematologic/Oncologic Medications  heparin  Injectable 5000 Unit(s) SubCutaneous every 8 hours  imatinib 400 milliGRAM(s) Oral daily    Antimicrobial/Immunologic Medications  cefepime   IVPB      cefepime   IVPB 500 milliGRAM(s) IV Intermittent every 12 hours  vancomycin  IVPB      vancomycin  IVPB 1000 milliGRAM(s) IV Intermittent every 12 hours    Endocrine/Metabolic Medications    Topical/Other Medications  chlorhexidine 0.12% Liquid 15 milliLiter(s) Swish and Spit two times a day      ICU Vital Signs Last 24 Hrs  T(C): 37.6 (2018 04:00), Max: 38.1 (2018 00:00)  T(F): 99.7 (2018 04:00), Max: 100.5 (2018 00:00)  HR: 82 (2018 07:00) (78 - 106)  BP: 87/39 (2018 07:00) (87/39 - 165/72)  BP(mean): 53 (:00) (53 - 108)  ABP: --  ABP(mean): --  RR: 15 (2018 07:00) (13 - 28)  SpO2: 98% (:) (86% - 100%)        Mode: AC/ CMV (Assist Control/ Continuous Mandatory Ventilation)  RR (machine): 16  TV (machine): 400  FiO2: 60  PEEP: 8  ITime: 1  MAP: 13  PIP: 23    ABG - ( 2018 03:59 )  pH, Arterial: 7.41  pH, Blood: x     /  pCO2: 40    /  pO2: 201   / HCO3: 25    / Base Excess: 0.3   /  SaO2: 100                 I&O's Summary    2018 07:  -  2018 07:00  --------------------------------------------------------  IN: 3438.2 mL / OUT: 709 mL / NET: 2729.2 mL      I&O's Detail    2018 07:  -  2018 07:00  --------------------------------------------------------  IN:    dexmedetomidine Infusion: 38.2 mL    dextrose 5% + sodium chloride 0.45% with potassium chloride 20 mEq/L: 1800 mL    IV PiggyBack: 400 mL    Osmolite: 600 mL    Sodium Chloride 0.9% IV Bolus: 500 mL    Solution: 100 mL  Total IN: 3438.2 mL    OUT:    Indwelling Catheter - Urethral: 559 mL    Nasoenteral Tube: 150 mL  Total OUT: 709 mL    Total NET: 2729.2 mL          PHYSICAL EXAM:    General/Neuro: intubated, sedated       Lungs:  clear to auscultation, poor expansion/effort.     Cardiovascular : S1, S2.  Regular rate and rhythm.  Cardiac Rhythm: Normal Sinus Rhythm    GI: Abdomen soft, Non-tender, Non-distended.    Nasogastric tube in place.        Extremities: Extremities warm, pink, well-perfused. Pulses:Rt     Lt    Derm:   Multiple areas of ecchymosisis about the face, hands and back.  Large left distal thigh hematoma    :       Marquez catheter in place. low urine outpur      CXR:     LABS:  CAPILLARY BLOOD GLUCOSE                              6.8    6.54  )-----------( 122      ( 2018 04:34 )             20.8       -    139  |  104  |  45<H>  ----------------------------<  210<H>  4.4   |  23  |  1.5    Ca    8.6      2018 04:34  Phos  2.2       Mg     2.1         TPro  4.7<L>  /  Alb  2.7<L>  /  TBili  4.1<H>  /  DBili  x   /  AST  26  /  ALT  22  /  AlkPhos  62  -27        CARDIAC MARKERS ( 2018 12:26 )  x     / <0.01 ng/mL / x     / x     / x          Urinalysis Basic - ( 2018 09:54 )    Color: Yellow / Appearance: Cloudy / S.020 / pH: x  Gluc: x / Ketone: Negative  / Bili: Negative / Urobili: 1.0 mg/dL   Blood: x / Protein: 100 mg/dL / Nitrite: Positive   Leuk Esterase: Small / RBC: 2-5 /HPF / WBC 3-5 /HPF   Sq Epi: x / Non Sq Epi: x / Bacteria: Many /HPF        Culture - Urine (collected 2018 15:21)  Source: .Urine Catheterized  Preliminary Report (2018 17:39):    >100,000 CFU/ml Escherichia coli    Culture - Blood (collected 2018 11:55)  Source: .Blood Blood  Gram Stain (2018 01:20):    Growth in aerobic and anaerobic bottles: Gram Positive Cocci in Clusters  Preliminary Report (2018 18:04):    Growth in aerobic and anaerobic bottles: Staphylococcus aureus    "Due to technical problems, Proteus sp. will Not be reported as part of    the BCID panel until further notice"    ***Blood Panel PCR results on this specimen are available    approximately 3 hours after the Gram stain result.***    Gram stain, PCR, and/or culture results may not always    correspond due to difference in methodologies.    ************************************************************    This PCR assay was performed using [employer].    The following targets are tested for: Enterococcus,    vancomycin resistant enterococci, Listeria monocytogenes,    coagulase negative staphylococci, S. aureus,    methicillin resistant S. aureus, Streptococcus agalactiae    (Group B), S. pneumoniae, S. pyogenes (Group A),    Acinetobacter baumannii, Enterobacter cloacae, E. coli,    Klebsiella oxytoca, K. pneumoniae, Proteus sp.,    Serratia marcescens, Haemophilus influenzae,    Neisseria meningitidis, Pseudomonas aeruginosa, Candida    albicans, C. glabrata, C krusei, C parapsilosis,    C. tropicalis and the KPC resistance gene.  Organism: Blood Culture PCR (2018 02:46)  Organism: Blood Culture PCR (2018 02:46)            REVIEW OF SYSTEMS    [x ] A ten-point review of systems was otherwise negative except as noted.

## 2018-06-27 NOTE — PROGRESS NOTE ADULT - ASSESSMENT
78y Female s/p SAH, unwitness fall, multiple fractures upgraded for tachycardia and desaturating    PLAN:   Neurologic:  metabolic encphehalopathy in setting of severe dementia, likely due to sepsis from uti;   baseline dementia; stage 4. Pain control with Tylenol. Neuro Checks Q1, on keppra, [place] J collar for c-2 fx.  Bacteremia- preliminary blood cultures postive gm postive cocci    Respiratory: on 4L NC. Duplex negative for VTE    Cardiovascular: keep normotensive    Gastrointestinal/Nutrition: NPO. PPI.  Still not appropriate for speech & Swallow evaluation    Renal/Genitourinary:  Incontinent  monitor I/O's     Hematologic: trend H/H, Hgb 7.3 (8.6, 9.2), transfuse if Hgb <7 or symptomatic, repeat CBC in am    Infectious Disease: vanc cefe for uti and associated bacteremia    Endocrine: none    family meeting today 78y Female s/p SAH, unwitness fall, multiple fractures upgraded for tachycardia and desaturating    PLAN:   Neurologic:  metabolic encphehalopathy in setting of severe dementia, likely due to sepsis from uti and bacteremia;   baseline dementia; stage 4. Pain control with Tylenol. Neuro Checks Q1, on keppra, [place] J collar for c-2 fx.  Bacteremia- preliminary blood cultures postive gm postive cocci    Respiratory: s/p intubation for respiratory failure, pna vs pulm edema vs effusion, vs encephalpathy, vs ards  sbt    Cardiovascular: keep normotensive, bolus as needed with 250 only    Gastrointestinal/Nutrition: tube feeds through ogt    Renal/Genitourinary:  lionel, fena, fluids as needed with NICM    Hematologic: h 6.8, give 2 units    Infectious Disease: vanc cefe for uti and associated bacteremia, awaiting sensitivities    Endocrine: none    family meeting yesterday: DNR, wanted intubation and full medical therapy, will reassess in 48h

## 2018-06-27 NOTE — PHYSICAL THERAPY INITIAL EVALUATION ADULT - SPECIFY REASON(S)
Chart reviewed. Pt. currently on bedrest. PT evaluation on hold pending OOB orders as appropriate. Will follow.

## 2018-06-28 ENCOUNTER — TRANSCRIPTION ENCOUNTER (OUTPATIENT)
Age: 78
End: 2018-06-28

## 2018-06-28 LAB
ANION GAP SERPL CALC-SCNC: 12 MMOL/L — SIGNIFICANT CHANGE UP (ref 7–14)
BLD GP AB SCN SERPL QL: SIGNIFICANT CHANGE UP
BUN SERPL-MCNC: 51 MG/DL — HIGH (ref 10–20)
CALCIUM SERPL-MCNC: 8.9 MG/DL — SIGNIFICANT CHANGE UP (ref 8.5–10.1)
CHLORIDE SERPL-SCNC: 105 MMOL/L — SIGNIFICANT CHANGE UP (ref 98–110)
CO2 SERPL-SCNC: 22 MMOL/L — SIGNIFICANT CHANGE UP (ref 17–32)
CREAT ?TM UR-MCNC: 72 MG/DL — SIGNIFICANT CHANGE UP
CREAT SERPL-MCNC: 1.6 MG/DL — HIGH (ref 0.7–1.5)
GLUCOSE SERPL-MCNC: 180 MG/DL — HIGH (ref 70–99)
GRAM STN FLD: SIGNIFICANT CHANGE UP
HCT VFR BLD CALC: 24.7 % — LOW (ref 37–47)
HGB BLD-MCNC: 8.5 G/DL — LOW (ref 12–16)
MAGNESIUM SERPL-MCNC: 2 MG/DL — SIGNIFICANT CHANGE UP (ref 1.8–2.4)
MCHC RBC-ENTMCNC: 32.2 PG — HIGH (ref 27–31)
MCHC RBC-ENTMCNC: 34.4 G/DL — SIGNIFICANT CHANGE UP (ref 32–37)
MCV RBC AUTO: 93.6 FL — SIGNIFICANT CHANGE UP (ref 81–99)
NRBC # BLD: 0 /100 WBCS — SIGNIFICANT CHANGE UP (ref 0–0)
PHOSPHATE SERPL-MCNC: 0.9 MG/DL — LOW (ref 2.1–4.9)
PLATELET # BLD AUTO: 159 K/UL — SIGNIFICANT CHANGE UP (ref 130–400)
POTASSIUM SERPL-MCNC: 4.2 MMOL/L — SIGNIFICANT CHANGE UP (ref 3.5–5)
POTASSIUM SERPL-SCNC: 4.2 MMOL/L — SIGNIFICANT CHANGE UP (ref 3.5–5)
RBC # BLD: 2.64 M/UL — LOW (ref 4.2–5.4)
RBC # FLD: 15.7 % — HIGH (ref 11.5–14.5)
SODIUM SERPL-SCNC: 139 MMOL/L — SIGNIFICANT CHANGE UP (ref 135–146)
SODIUM UR-SCNC: <20 MMOL/L — SIGNIFICANT CHANGE UP
SPECIMEN SOURCE: SIGNIFICANT CHANGE UP
TYPE + AB SCN PNL BLD: SIGNIFICANT CHANGE UP
WBC # BLD: 16.54 K/UL — HIGH (ref 4.8–10.8)
WBC # FLD AUTO: 16.54 K/UL — HIGH (ref 4.8–10.8)

## 2018-06-28 PROCEDURE — 99291 CRITICAL CARE FIRST HOUR: CPT

## 2018-06-28 RX ORDER — VANCOMYCIN HCL 1 G
750 VIAL (EA) INTRAVENOUS DAILY
Qty: 0 | Refills: 0 | Status: DISCONTINUED | OUTPATIENT
Start: 2018-06-28 | End: 2018-06-29

## 2018-06-28 RX ORDER — MORPHINE SULFATE 50 MG/1
4 CAPSULE, EXTENDED RELEASE ORAL EVERY 4 HOURS
Qty: 0 | Refills: 0 | Status: DISCONTINUED | OUTPATIENT
Start: 2018-06-28 | End: 2018-07-02

## 2018-06-28 RX ORDER — MORPHINE SULFATE 50 MG/1
4 CAPSULE, EXTENDED RELEASE ORAL
Qty: 0 | Refills: 0 | Status: DISCONTINUED | OUTPATIENT
Start: 2018-06-28 | End: 2018-07-02

## 2018-06-28 RX ADMIN — Medication 10 MILLIGRAM(S): at 11:24

## 2018-06-28 RX ADMIN — HEPARIN SODIUM 5000 UNIT(S): 5000 INJECTION INTRAVENOUS; SUBCUTANEOUS at 22:23

## 2018-06-28 RX ADMIN — PANTOPRAZOLE SODIUM 40 MILLIGRAM(S): 20 TABLET, DELAYED RELEASE ORAL at 11:23

## 2018-06-28 RX ADMIN — Medication 650 MILLIGRAM(S): at 05:12

## 2018-06-28 RX ADMIN — Medication 250 MILLIGRAM(S): at 05:11

## 2018-06-28 RX ADMIN — MORPHINE SULFATE 4 MILLIGRAM(S): 50 CAPSULE, EXTENDED RELEASE ORAL at 17:45

## 2018-06-28 RX ADMIN — MORPHINE SULFATE 4 MILLIGRAM(S): 50 CAPSULE, EXTENDED RELEASE ORAL at 22:24

## 2018-06-28 RX ADMIN — MORPHINE SULFATE 4 MILLIGRAM(S): 50 CAPSULE, EXTENDED RELEASE ORAL at 22:40

## 2018-06-28 RX ADMIN — MEMANTINE HYDROCHLORIDE 10 MILLIGRAM(S): 10 TABLET ORAL at 11:24

## 2018-06-28 RX ADMIN — MORPHINE SULFATE 4 MILLIGRAM(S): 50 CAPSULE, EXTENDED RELEASE ORAL at 17:30

## 2018-06-28 RX ADMIN — Medication 650 MILLIGRAM(S): at 12:58

## 2018-06-28 RX ADMIN — MORPHINE SULFATE 4 MILLIGRAM(S): 50 CAPSULE, EXTENDED RELEASE ORAL at 17:18

## 2018-06-28 RX ADMIN — CHLORHEXIDINE GLUCONATE 15 MILLILITER(S): 213 SOLUTION TOPICAL at 05:11

## 2018-06-28 RX ADMIN — ESCITALOPRAM OXALATE 5 MILLIGRAM(S): 10 TABLET, FILM COATED ORAL at 11:23

## 2018-06-28 RX ADMIN — LEVETIRACETAM 420 MILLIGRAM(S): 250 TABLET, FILM COATED ORAL at 23:11

## 2018-06-28 RX ADMIN — LEVETIRACETAM 420 MILLIGRAM(S): 250 TABLET, FILM COATED ORAL at 10:12

## 2018-06-28 RX ADMIN — CEFEPIME 100 MILLIGRAM(S): 1 INJECTION, POWDER, FOR SOLUTION INTRAMUSCULAR; INTRAVENOUS at 05:11

## 2018-06-28 RX ADMIN — HEPARIN SODIUM 5000 UNIT(S): 5000 INJECTION INTRAVENOUS; SUBCUTANEOUS at 05:12

## 2018-06-28 RX ADMIN — Medication 650 MILLIGRAM(S): at 00:00

## 2018-06-28 RX ADMIN — HEPARIN SODIUM 5000 UNIT(S): 5000 INJECTION INTRAVENOUS; SUBCUTANEOUS at 13:11

## 2018-06-28 RX ADMIN — GABAPENTIN 100 MILLIGRAM(S): 400 CAPSULE ORAL at 11:23

## 2018-06-28 RX ADMIN — Medication 650 MILLIGRAM(S): at 13:11

## 2018-06-28 RX ADMIN — MORPHINE SULFATE 4 MILLIGRAM(S): 50 CAPSULE, EXTENDED RELEASE ORAL at 16:53

## 2018-06-28 NOTE — PROGRESS NOTE ADULT - ATTENDING COMMENTS
Guru (son) and daughter (Holley) decided to extubate.     Plan  Morphine 4mg IV x1 and Ativan 0.5 mg IV prior to extubation.   Morphine 4mg IV push every 15 min for respiratory distress/pain  Ativan 0.5mg every 1H for restlessness/agitation  Comfort measures only - no blood work, no fingersticks, no oxygen saturation Guru (son) and daughter (Holley) decided to extubate today in pursuit of comfort measures only thereafter.     Plan  Morphine 4mg IV x1 prior to extubation and q4h ATC   Morphine 4mg IV push every 15 min for respiratory distress/pain  Ativan 1mg IV every 6h PRN for restlessness/agitation  Comfort measures only - no blood work, no fingersticks, no oxygen saturation    Transfer to  once extubated.  We will follow  Call [phone] PRN

## 2018-06-28 NOTE — PROGRESS NOTE ADULT - ATTENDING COMMENTS
pt continues to deteriorate despite broad spectrum abx and maximal supportive care  family likely to comfort today, family meeting in pm

## 2018-06-28 NOTE — PHYSICAL THERAPY INITIAL EVALUATION ADULT - SPECIFY REASON(S)
Chart reviewed, specifically progress note by Dr. Myles dated today. Pt. remains on bedrest and inappropriate for skilled PT interventions. Will D/C attempts for PT eval.

## 2018-06-28 NOTE — PROGRESS NOTE ADULT - SUBJECTIVE AND OBJECTIVE BOX
KYM ROTH  [id number]  78y Female    Indication for ICU admission: s/p fall   Admit Date:06-21-18      HPI 77y/o F with PMHx of Dementia stage 4, presents for evaluation of possible fall, unknown onset and unknown LOC. The patient lives at home with daughter and home aide, last night she went to bed and was later heard crying for help next to her bed. The patient was taken to ED for evaluation of signs of trauma, left face hematoma, left shoulder pain, left wrist pain and left thigh hematoma. Head CT showed Small scattered left-sided subarachnoid hemorrhage but no significant mass effect. CT of cervical spine showed minimally displaced avulsion fracture of the anterior-inferior C2, vertebral body with mild widening of the anterior C2-C3 disc space, consistent with extension teardrop fracture. CT Chest with IV contrast showed acute fractures of the left posterior 10th and 11th, new indeterminate 2.2 cm left suprarenal mass and no pneumothorax. CT maxillofacial showed acute nondisplaced fractures of the mandibular bodies bilaterally. The patient was transfered to ICU for continued care       24HRS EVENT:  HD #9  Neuro; baseline dementia stage 4, lethargic. d/c precedex  Resp: Intubated due to desats.    CVS: Hypotensive 98/46...bolus 250x2. holding labetalol, amlodipine,   GI: on tube feeds... Protonix PPI... NGT in place, TF started advanced to goal 60cc/h  : UA positive for nitrite -- started on abx: cefepime. Blood cx results pending   -was not voiding during the day, bladder scan showed >300, marquez inserted 250 output, u/o is red tinged.   Heme: H/H  6.8, transfuse 2PRBCs. f/u 23:30 lab  ID:  Vanc/cefepime for UTI/PNA  -6/25 blood cx- gram + cocci in clusters in both bottles  -6/25 urine cx- pending    Family meeting schedule for doug with Dr Myles after 1pm to make decision regarding comfort care. Pt is DNR but not DNI  .   DVT PTX: HSQ, SCD    GI PTX: protonix    ***Tubes/Lines/Drains  ***  Peripheral IV           Urinary Catheter		Indication: Strict I&O    Date Placed: 6/21      REVIEW OF SYSTEMS    [x ] A ten-point review of systems was otherwise negative except as noted.    Daily     Daily     Diet, NPO with Tube Feed:   Tube Feeding Modality: Nasogastric  Osmolite 1.5 Ashu  Total Volume for 24 Hours (mL): 1440  Continuous  Starting Tube Feed Rate mL per Hour: 10  Increase Tube Feed Rate by (mL): 20     Every 4 hours  Until Goal Tube Feed Rate (mL per Hour): 60  Tube Feed Duration (in Hours): 24  Tube Feed Start Time: 18:00 (06-26-18 @ 17:53)      CURRENT MEDS:  Neurologic Medications  acetaminophen   Tablet. 650 milliGRAM(s) Oral every 6 hours  escitalopram 5 milliGRAM(s) Oral daily  gabapentin 100 milliGRAM(s) Oral daily  levETIRAcetam  IVPB 500 milliGRAM(s) IV Intermittent every 12 hours  memantine 10 milliGRAM(s) Oral daily    Respiratory Medications  ALBUTerol/ipratropium for Nebulization 3 milliLiter(s) Nebulizer every 6 hours    Cardiovascular Medications    Gastrointestinal Medications  pantoprazole   Suspension 40 milliGRAM(s) Oral daily    Genitourinary Medications  oxybutynin 10 milliGRAM(s) Oral daily    Hematologic/Oncologic Medications  heparin  Injectable 5000 Unit(s) SubCutaneous every 8 hours  imatinib 400 milliGRAM(s) Oral daily    Antimicrobial/Immunologic Medications  cefepime   IVPB      cefepime   IVPB 500 milliGRAM(s) IV Intermittent every 12 hours  vancomycin  IVPB      vancomycin  IVPB 1000 milliGRAM(s) IV Intermittent every 12 hours    Endocrine/Metabolic Medications    Topical/Other Medications  chlorhexidine 0.12% Liquid 15 milliLiter(s) Swish and Spit two times a day      ICU Vital Signs Last 24 Hrs  T(C): 37.9 (28 Jun 2018 08:00), Max: 38.4 (27 Jun 2018 20:00)  T(F): 100.3 (28 Jun 2018 08:00), Max: 101.1 (27 Jun 2018 20:00)  HR: 112 (28 Jun 2018 08:00) (82 - 124)  BP: 162/64 (28 Jun 2018 08:00) (91/45 - 180/70)  BP(mean): 96 (28 Jun 2018 08:00) (58 - 110)  ABP: --  ABP(mean): --  RR: 25 (28 Jun 2018 08:00) (9 - 29)  SpO2: 98% (28 Jun 2018 08:00) (96% - 100%)      Mode: AC/ CMV (Assist Control/ Continuous Mandatory Ventilation)  RR (machine): 16  TV (machine): 400  FiO2: 40  PEEP: 8  ITime: 1  MAP: 12  PIP: 23    ABG - ( 28 Jun 2018 04:15 )  pH, Arterial: 7.41  pH, Blood: x     /  pCO2: 39    /  pO2: 103   / HCO3: 25    / Base Excess: 0.3   /  SaO2: 99                  I&O's Summary    27 Jun 2018 07:01  -  28 Jun 2018 07:00  --------------------------------------------------------  IN: 2997.8 mL / OUT: 651 mL / NET: 2346.8 mL      I&O's Detail    27 Jun 2018 07:01  -  28 Jun 2018 07:00  --------------------------------------------------------  IN:    dexmedetomidine Infusion: 7.8 mL    dextrose 5% + sodium chloride 0.45% with potassium chloride 20 mEq/L: 225 mL    IV PiggyBack: 800 mL    Osmolite: 1440 mL    Packed Red Blood Cells: 325 mL    Solution: 200 mL  Total IN: 2997.8 mL    OUT:    Indwelling Catheter - Urethral: 651 mL  Total OUT: 651 mL    Total NET: 2346.8 mL          PHYSICAL EXAM:    General/Neuro  RASS:        -1     GCS: 8T     = 3   / V 1T    / M 4  Deficits:       lethagic , but arousable,      Lungs:      Course breath sounds b/l    Cardiovascular : S1, S2.  Regular rate and rhythm.  Peripheral edema   Cardiac Rhythm: Normal Sinus Rhythm    GI: Abdomen soft, Non-tender, Non-distended.  BS present        Extremities: Extremities warm, pink, well-perfused.   Left thigh hematoma stable    Derm: multiple areas of ecchymosis     :       Marquez catheter in place.        LABS:                            8.5    16.54 )-----------( 159      ( 28 Jun 2018 00:49 )             24.7       06-28    139  |  105  |  51<H>  ----------------------------<  180<H>  4.2   |  22  |  1.6<H>    Ca    8.9      28 Jun 2018 00:49  Phos  0.9     06-28  Mg     2.0     06-28    TPro  4.7<L>  /  Alb  2.7<L>  /  TBili  4.1<H>  /  DBili  x   /  AST  26  /  ALT  22  /  AlkPhos  62  06-27                Culture - Bronchial (collected 27 Jun 2018 10:26)  Source: Bronch Wash Bronchoalveolar Lavage  Gram Stain (28 Jun 2018 07:06):    Few Squamous epithelial cells per low power field    Few polymorphonuclear leukocytes per low power field    Rare Gram variable coccobacilli per oil power field    Culture - Urine (collected 25 Jun 2018 15:21)  Source: .Urine Catheterized  Final Report (27 Jun 2018 20:35):    >100,000 CFU/ml Escherichia coli  Organism: Escherichia coli (27 Jun 2018 20:35)  Organism: Escherichia coli (27 Jun 2018 20:35)    Culture - Blood (collected 25 Jun 2018 11:55)  Source: .Blood Blood  Gram Stain (26 Jun 2018 01:20):    Growth in aerobic and anaerobic bottles: Gram Positive Cocci in Clusters  Final Report (27 Jun 2018 17:03):    Growth in aerobic and anaerobic bottles: Staphylococcus aureus

## 2018-06-28 NOTE — GOALS OF CARE CONVERSATION - PERSONAL ADVANCE DIRECTIVE - STAFF/PROVIDER
Dr. Sewell DO Palliative Medicine; Dr. Myles ICU Attending Physician
Dr. Donato CLEMONS Palliative Medicine, Dr. Myles ICU Attending, Dr. Blevins ICU
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
## 2018-06-28 NOTE — GOALS OF CARE CONVERSATION - PERSONAL ADVANCE DIRECTIVE - TREATMENT GUIDELINES
DNR Order
No blood draws/No antibiotics/Comfort measures only/DNR Order/No artificial nutrition/No IV fluids

## 2018-06-28 NOTE — PROVIDER CONTACT NOTE (OTHER) - SITUATION
Pt has not voided since 1500, bladder scan 50cc
inquired if pt can have sequentials, because pt has hematoma to left thigh, MD stated pt can have sequentials, inquired if pt can be Turned and repositioned on sides, MD stated pt can be turned
s/p fall
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
## 2018-06-28 NOTE — GOALS OF CARE CONVERSATION - PERSONAL ADVANCE DIRECTIVE - STAFF/SOCIAL WORKER
Carina Perez [facility] Palliative Medicine
Carina Perez [facility] Palliative Medicine

## 2018-06-29 RX ORDER — MORPHINE SULFATE 50 MG/1
4 CAPSULE, EXTENDED RELEASE ORAL
Qty: 100 | Refills: 0 | Status: DISCONTINUED | OUTPATIENT
Start: 2018-06-29 | End: 2018-07-02

## 2018-06-29 RX ORDER — ROBINUL 0.2 MG/ML
0.2 INJECTION INTRAMUSCULAR; INTRAVENOUS EVERY 6 HOURS
Qty: 0 | Refills: 0 | Status: DISCONTINUED | OUTPATIENT
Start: 2018-06-29 | End: 2018-07-02

## 2018-06-29 RX ORDER — ACETAMINOPHEN 500 MG
650 TABLET ORAL ONCE
Qty: 0 | Refills: 0 | Status: COMPLETED | OUTPATIENT
Start: 2018-06-29 | End: 2018-06-29

## 2018-06-29 RX ADMIN — MORPHINE SULFATE 4 MILLIGRAM(S): 50 CAPSULE, EXTENDED RELEASE ORAL at 06:44

## 2018-06-29 RX ADMIN — Medication 1 MILLIGRAM(S): at 13:23

## 2018-06-29 RX ADMIN — MORPHINE SULFATE 4 MILLIGRAM(S): 50 CAPSULE, EXTENDED RELEASE ORAL at 14:05

## 2018-06-29 RX ADMIN — CHLORHEXIDINE GLUCONATE 15 MILLILITER(S): 213 SOLUTION TOPICAL at 05:18

## 2018-06-29 RX ADMIN — MORPHINE SULFATE 2 MILLIGRAM(S): 50 CAPSULE, EXTENDED RELEASE ORAL at 07:41

## 2018-06-29 RX ADMIN — MORPHINE SULFATE 4 MILLIGRAM(S): 50 CAPSULE, EXTENDED RELEASE ORAL at 05:30

## 2018-06-29 RX ADMIN — Medication 650 MILLIGRAM(S): at 22:48

## 2018-06-29 RX ADMIN — MORPHINE SULFATE 4 MILLIGRAM(S): 50 CAPSULE, EXTENDED RELEASE ORAL at 02:30

## 2018-06-29 RX ADMIN — MORPHINE SULFATE 4 MILLIGRAM(S): 50 CAPSULE, EXTENDED RELEASE ORAL at 07:05

## 2018-06-29 RX ADMIN — HEPARIN SODIUM 5000 UNIT(S): 5000 INJECTION INTRAVENOUS; SUBCUTANEOUS at 05:18

## 2018-06-29 RX ADMIN — MORPHINE SULFATE 4 MILLIGRAM(S): 50 CAPSULE, EXTENDED RELEASE ORAL at 02:14

## 2018-06-29 RX ADMIN — MORPHINE SULFATE 4 MILLIGRAM(S): 50 CAPSULE, EXTENDED RELEASE ORAL at 08:57

## 2018-06-29 RX ADMIN — Medication 3 MILLILITER(S): at 08:06

## 2018-06-29 RX ADMIN — MORPHINE SULFATE 4 MILLIGRAM(S): 50 CAPSULE, EXTENDED RELEASE ORAL at 05:18

## 2018-06-29 RX ADMIN — LEVETIRACETAM 420 MILLIGRAM(S): 250 TABLET, FILM COATED ORAL at 21:51

## 2018-06-29 RX ADMIN — MORPHINE SULFATE 4 MILLIGRAM(S): 50 CAPSULE, EXTENDED RELEASE ORAL at 11:07

## 2018-06-29 RX ADMIN — ROBINUL 0.2 MILLIGRAM(S): 0.2 INJECTION INTRAMUSCULAR; INTRAVENOUS at 23:44

## 2018-06-29 RX ADMIN — MORPHINE SULFATE 4 MILLIGRAM(S): 50 CAPSULE, EXTENDED RELEASE ORAL at 13:26

## 2018-06-29 RX ADMIN — ROBINUL 0.2 MILLIGRAM(S): 0.2 INJECTION INTRAMUSCULAR; INTRAVENOUS at 18:51

## 2018-06-29 RX ADMIN — MORPHINE SULFATE 4 MG/HR: 50 CAPSULE, EXTENDED RELEASE ORAL at 13:51

## 2018-06-29 RX ADMIN — LEVETIRACETAM 420 MILLIGRAM(S): 250 TABLET, FILM COATED ORAL at 13:25

## 2018-06-29 RX ADMIN — ROBINUL 0.2 MILLIGRAM(S): 0.2 INJECTION INTRAMUSCULAR; INTRAVENOUS at 11:09

## 2018-06-29 NOTE — PROGRESS NOTE ADULT - SUBJECTIVE AND OBJECTIVE BOX
Progress Note: Trauma Surgery  Patient: KYM ROTH , 78y (1940)Female   MRN: [id number]  Location: [place] [address] A  Visit: 06-21-18 Inpatient  Date: 06-29-18 @ 02:26  Hospital Day: 9    Procedure/Injury: Presented S/P unwitnessed fall. Left 10-11 rib frx, C2 avulsion frx, BL mandibular body frx, Small L SAH L thigh hematoma, L wrist frx  Events over 24h: DG from ICU to  bed in vent unit, No escalation in care, Comfort measures only, DNR/DNI after family meeting yesterday    Vitals: T(F): 98.9 (06-28-18 @ 16:00), Max: 100.8 (06-28-18 @ 04:00)  HR: 132 (06-28-18 @ 19:00)  BP: 185/73 (06-28-18 @ 17:00) (151/67 - 200/93)  RR: 22 (06-28-18 @ 19:00)  SpO2: 96% (06-28-18 @ 17:08)  RR (machine): 16, TV (machine): 400, FiO2: 40, PEEP: 8, PIP: 26    In:   06-28-18 @ 07:01  -  06-29-18 @ 02:26  --------------------------------------------------------  IN: 540 mL    Out:   06-28-18 @ 07:01  -  06-29-18 @ 02:26  --------------------------------------------------------  OUT:    Indwelling Catheter - Urethral: 765 mL  Total OUT: 765 mL    Net:   06-28-18 @ 07:01  -  06-29-18 @ 02:26  --------------------------------------------------------  NET: -225 mL    Diet: Diet, NPO with Tube Feed:   Tube Feeding Modality: Nasogastric  Osmolite 1.5 Ashu  Total Volume for 24 Hours (mL): 1440  Continuous  Starting Tube Feed Rate mL per Hour: 10  Increase Tube Feed Rate by (mL): 20     Every 4 hours  Until Goal Tube Feed Rate (mL per Hour): 60  Tube Feed Duration (in Hours): 24  Tube Feed Start Time: 18:00 (06-26-18 @ 17:53)    Physical Examination:  General: NAD, AAOx1  HEENT: [place] J collar in place  Heart: S1 S2, No MRG RRR   Lungs: CTABL +BS Equal BL, No WRC  Abdomen:  +BS. SNDNT. Obese.   MSK/Extremities: ROM intact BL UE/LE, LUE in splint     Medications: [Standing]  acetaminophen   Tablet. 650 milliGRAM(s) Oral every 6 hours  ALBUTerol/ipratropium for Nebulization 3 milliLiter(s) Nebulizer every 6 hours  cefepime   IVPB      cefepime   IVPB 500 milliGRAM(s) IV Intermittent every 12 hours  chlorhexidine 0.12% Liquid 15 milliLiter(s) Swish and Spit two times a day  escitalopram 5 milliGRAM(s) Oral daily  gabapentin 100 milliGRAM(s) Oral daily  heparin  Injectable 5000 Unit(s) SubCutaneous every 8 hours  imatinib 400 milliGRAM(s) Oral daily  levETIRAcetam  IVPB 500 milliGRAM(s) IV Intermittent every 12 hours  memantine 10 milliGRAM(s) Oral daily  morphine  - Injectable 4 milliGRAM(s) IV Push every 4 hours  oxybutynin 10 milliGRAM(s) Oral daily  pantoprazole   Suspension 40 milliGRAM(s) Oral daily  vancomycin  IVPB 750 milliGRAM(s) IV Intermittent daily    Medications:[PRN]  LORazepam   Injectable 1 milliGRAM(s) IntraMuscular every 6 hours PRN  morphine  - Injectable 4 milliGRAM(s) IV Push every 15 minutes PRN    Labs:                        8.5    16.54 )-----------( 159      ( 28 Jun 2018 00:49 )             24.7     06-28    139  |  105  |  51<H>  ----------------------------<  180<H>  4.2   |  22  |  1.6<H>    Ca    8.9      28 Jun 2018 00:49  Phos  0.9     06-28  Mg     2.0     06-28    TPro  4.7<L>  /  Alb  2.7<L>  /  TBili  4.1<H>  /  DBili  x   /  AST  26  /  ALT  22  /  AlkPhos  62  06-27    LIVER FUNCTIONS - ( 27 Jun 2018 04:34 )  Alb: 2.7 g/dL / Pro: 4.7 g/dL / ALK PHOS: 62 U/L / ALT: 22 U/L / AST: 26 U/L / GGT: x           ABG - ( 28 Jun 2018 04:15 )  pH: 7.41  /  pCO2: 39    /  pO2: 103   / HCO3: 25    / Base Excess: 0.3   /  SaO2: 99        Imaging:  None/24h

## 2018-06-29 NOTE — PROGRESS NOTE ADULT - SUBJECTIVE AND OBJECTIVE BOX
78yFemale  admitted for Patient is a 78y old  Female who presents with a chief complaint of sepsis (29 Jun 2018 10:15) Presented S/P unwitnessed fall. Left 10-11 rib frx, C2 avulsion frx, BL mandibular body frx, Small L SAH L thigh hematoma, L wrist frx. Pt was intubated for airway protection in ICU. Did not improve. Family met with trauma team and palliative care team and decided on comfort measures only.  S/P liberation and transfer from ICU to advanced illness unit                 HOSPITAL DAY NO: 9    family refusing VS                          Dementia without behavioral disturbance, unspecified dementia type        Vent dependent: Y____  N __x__                                            ABG - ( 28 Jun 2018 04:15 )  pH, Arterial: 7.41  pH, Blood: x     /  pCO2: 39    /  pO2: 103   / HCO3: 25    / Base Excess: 0.3   /  SaO2: 99                  Significant exam findings: no acute events   MS: pt minimally responsive  CHEST : course BS B/L  ABDOMEN: soft NT (+) BS  SKIN: intact          Nutrition:              comfort feeds if awake      LABS:                          8.5    16.54 )-----------( 159      ( 28 Jun 2018 00:49 )             24.7                                               06-28    139  |  105  |  51<H>  ----------------------------<  180<H>  4.2   |  22  |  1.6<H>    Ca    8.9      28 Jun 2018 00:49  Phos  0.9     06-28  Mg     2.0     06-28                                                                                           Culture - Bronchial (collected 27 Jun 2018 10:26)  Source: Bronch Wash Bronchoalveolar Lavage  Gram Stain (28 Jun 2018 07:06):    Few Squamous epithelial cells per low power field    Few polymorphonuclear leukocytes per low power field    Rare Gram variable coccobacilli per oil power field  Preliminary Report (28 Jun 2018 21:46):    Normal Respiratory Xiao present                                                       MEDICATIONS  (STANDING):  acetaminophen   Tablet. 650 milliGRAM(s) Oral every 6 hours  ALBUTerol/ipratropium for Nebulization 3 milliLiter(s) Nebulizer every 6 hours  cefepime   IVPB      cefepime   IVPB 500 milliGRAM(s) IV Intermittent every 12 hours  chlorhexidine 0.12% Liquid 15 milliLiter(s) Swish and Spit two times a day  escitalopram 5 milliGRAM(s) Oral daily  gabapentin 100 milliGRAM(s) Oral daily  glycopyrrolate Injectable 0.2 milliGRAM(s) IV Push every 6 hours  heparin  Injectable 5000 Unit(s) SubCutaneous every 8 hours  imatinib 400 milliGRAM(s) Oral daily  levETIRAcetam  IVPB 500 milliGRAM(s) IV Intermittent every 12 hours  memantine 10 milliGRAM(s) Oral daily  morphine  - Injectable 4 milliGRAM(s) IV Push every 4 hours  oxybutynin 10 milliGRAM(s) Oral daily  pantoprazole   Suspension 40 milliGRAM(s) Oral daily  vancomycin  IVPB 750 milliGRAM(s) IV Intermittent daily    MEDICATIONS  (PRN):  LORazepam   Injectable 1 milliGRAM(s) IntraMuscular every 6 hours PRN Agitation  morphine  - Injectable 4 milliGRAM(s) IV Push every 15 minutes PRN respiratory distress      Assessment Pt is a 78 yr old with dementia s/p unwitnessed fall at home with multiple fractures. s/p liberation in ICU and d/g to AI unit. Pt comfort measures only, getting Morphine IV RTC and rubinol. Pt remains comfortable and minimally responsive.        PLAN:  DNR/I CMO  monitor for symptoms  comfort feeds if awake

## 2018-06-29 NOTE — PROGRESS NOTE ADULT - ATTENDING COMMENTS
Pt seen, unresponsive, in mild resp distress w agitation.  Son at bedside.  Plan to cont CMO w end of life care.    Rec:  Start morphine gtt @4mg/hr  cont ativan 1mg IV q3h PRN agitation  Robinul ATC      Grave prognosis known. Pt will  in hospital.  We will follow  Call [phone] PRN

## 2018-06-29 NOTE — PROGRESS NOTE ADULT - ASSESSMENT
Assessment:  78y Female patient Presented S/P unwitnessed fall. Left 10-11 rib frx, C2 avulsion frx, BL mandibular body frx, Small L SAH L thigh hematoma, L wrist frx  Events over 24h: DG from ICU to AI bed in vent unit, No escalation in care, Comfort measures only, DNR/DNI after family meeting yesterday, with the above physical exam, labs, and imaging findings.    Plan:  Palliative: moved to  bed  Family stating their only wish is for patient to be pain free and comfortable at this time  No further medical ventilation  No further blood work or finger sticks  No artifical nutrition or IV hydration  No antibiotics    Date/Time: 06-29-18 @ 02:26

## 2018-06-30 LAB
-  AMPICILLIN/SULBACTAM: SIGNIFICANT CHANGE UP
-  CEFAZOLIN: SIGNIFICANT CHANGE UP
-  CLINDAMYCIN: SIGNIFICANT CHANGE UP
-  ERYTHROMYCIN: SIGNIFICANT CHANGE UP
-  GENTAMICIN: SIGNIFICANT CHANGE UP
-  OXACILLIN: SIGNIFICANT CHANGE UP
-  PENICILLIN: SIGNIFICANT CHANGE UP
-  RIFAMPIN: SIGNIFICANT CHANGE UP
-  TETRACYCLINE: SIGNIFICANT CHANGE UP
-  TRIMETHOPRIM/SULFAMETHOXAZOLE: SIGNIFICANT CHANGE UP
-  VANCOMYCIN: SIGNIFICANT CHANGE UP
CULTURE RESULTS: SIGNIFICANT CHANGE UP
METHOD TYPE: SIGNIFICANT CHANGE UP
ORGANISM # SPEC MICROSCOPIC CNT: SIGNIFICANT CHANGE UP
ORGANISM # SPEC MICROSCOPIC CNT: SIGNIFICANT CHANGE UP
SPECIMEN SOURCE: SIGNIFICANT CHANGE UP

## 2018-06-30 RX ADMIN — ROBINUL 0.2 MILLIGRAM(S): 0.2 INJECTION INTRAMUSCULAR; INTRAVENOUS at 23:24

## 2018-06-30 RX ADMIN — MORPHINE SULFATE 4 MG/HR: 50 CAPSULE, EXTENDED RELEASE ORAL at 10:54

## 2018-06-30 RX ADMIN — LEVETIRACETAM 420 MILLIGRAM(S): 250 TABLET, FILM COATED ORAL at 10:54

## 2018-06-30 RX ADMIN — LEVETIRACETAM 420 MILLIGRAM(S): 250 TABLET, FILM COATED ORAL at 21:11

## 2018-06-30 RX ADMIN — ROBINUL 0.2 MILLIGRAM(S): 0.2 INJECTION INTRAMUSCULAR; INTRAVENOUS at 12:17

## 2018-06-30 RX ADMIN — ROBINUL 0.2 MILLIGRAM(S): 0.2 INJECTION INTRAMUSCULAR; INTRAVENOUS at 05:08

## 2018-06-30 RX ADMIN — CHLORHEXIDINE GLUCONATE 15 MILLILITER(S): 213 SOLUTION TOPICAL at 17:45

## 2018-06-30 RX ADMIN — ROBINUL 0.2 MILLIGRAM(S): 0.2 INJECTION INTRAMUSCULAR; INTRAVENOUS at 17:45

## 2018-06-30 RX ADMIN — MORPHINE SULFATE 4 MG/HR: 50 CAPSULE, EXTENDED RELEASE ORAL at 12:17

## 2018-06-30 RX ADMIN — CHLORHEXIDINE GLUCONATE 15 MILLILITER(S): 213 SOLUTION TOPICAL at 05:08

## 2018-06-30 NOTE — PROGRESS NOTE ADULT - ASSESSMENT
Plan:  Palliative: moved to  bed  Family stating their only wish is for patient to be pain free and comfortable at this time  No further medical ventilation  No further blood work or finger sticks  No artifical nutrition or IV hydration  No antibiotics

## 2018-06-30 NOTE — PROGRESS NOTE ADULT - SUBJECTIVE AND OBJECTIVE BOX
GENERAL SURGERY Progress Note    Procedure/Injury: Presented S/P unwitnessed fall. Left 10-11 rib frx, C2 avulsion frx, BL mandibular body frx, Small L SAH L thigh hematoma, L wrist frx  Events over 24h:  No escalation in care, Comfort measures only, DNR/DNI after family meeting yesterday    PAST MEDICAL & SURGICAL HISTORY:  Dementia without behavioral disturbance, unspecified dementia type  No significant past surgical history    MEDICATIONS  (STANDING):  acetaminophen   Tablet. 650 milliGRAM(s) Oral every 6 hours  chlorhexidine 0.12% Liquid 15 milliLiter(s) Swish and Spit two times a day  escitalopram 5 milliGRAM(s) Oral daily  gabapentin 100 milliGRAM(s) Oral daily  glycopyrrolate Injectable 0.2 milliGRAM(s) IV Push every 6 hours  levETIRAcetam  IVPB 500 milliGRAM(s) IV Intermittent every 12 hours  memantine 10 milliGRAM(s) Oral daily  morphine  - Injectable 4 milliGRAM(s) IV Push every 4 hours  morphine  Infusion 4 mG/Hr (4 mL/Hr) IV Continuous <Continuous>  oxybutynin 10 milliGRAM(s) Oral daily    MEDICATIONS  (PRN):  LORazepam   Injectable 1 milliGRAM(s) IntraMuscular every 6 hours PRN Agitation  morphine  - Injectable 4 milliGRAM(s) IV Push every 15 minutes PRN respiratory distress      I&O's Detail    28 Jun 2018 07:01  -  29 Jun 2018 07:00  --------------------------------------------------------  IN:    Osmolite: 540 mL  Total IN: 540 mL    OUT:    Indwelling Catheter - Urethral: 1165 mL  Total OUT: 1165 mL    Total NET: -625 mL      29 Jun 2018 07:01  -  30 Jun 2018 02:20  --------------------------------------------------------  IN:    morphine  Infusion: 32 mL    Solution: 50 mL  Total IN: 82 mL    OUT:    Indwelling Catheter - Urethral: 400 mL  Total OUT: 400 mL    Total NET: -318 mL            Vital Signs Last 24 Hrs  T(C): 39.6 (29 Jun 2018 22:49), Max: 39.6 (29 Jun 2018 22:49)  T(F): 103.2 (29 Jun 2018 22:49), Max: 103.2 (29 Jun 2018 22:49)  HR: 125 (29 Jun 2018 15:34) (125 - 125)  BP: 145/60 (29 Jun 2018 15:34) (145/60 - 145/60)  BP(mean): 87 (29 Jun 2018 15:34) (87 - 87)  RR: 20 (29 Jun 2018 15:34) (20 - 20)  SpO2: --  Physical Examination:  General: NAD, AAOx1  HEENT: MIAMI J collar in place  Heart: S1 S2, No MRG RRR   Lungs: CTABL +BS Equal BL, No WRC  Abdomen:  +BS. SNDNT. Obese.   MSK/Extremities: ROM intact BL UE/LE, LUE in splint     LABS:  ABG - ( 28 Jun 2018 04:15 )  pH, Arterial: 7.41  pH, Blood: x     /  pCO2: 39    /  pO2: 103   / HCO3: 25    / Base Excess: 0.3   /  SaO2: 99

## 2018-07-01 VITALS
TEMPERATURE: 98 F | HEART RATE: 108 BPM | SYSTOLIC BLOOD PRESSURE: 144 MMHG | WEIGHT: 170.2 LBS | DIASTOLIC BLOOD PRESSURE: 61 MMHG

## 2018-07-01 RX ADMIN — MORPHINE SULFATE 4 MILLIGRAM(S): 50 CAPSULE, EXTENDED RELEASE ORAL at 14:29

## 2018-07-01 RX ADMIN — ROBINUL 0.2 MILLIGRAM(S): 0.2 INJECTION INTRAMUSCULAR; INTRAVENOUS at 23:42

## 2018-07-01 RX ADMIN — LEVETIRACETAM 420 MILLIGRAM(S): 250 TABLET, FILM COATED ORAL at 21:35

## 2018-07-01 RX ADMIN — MORPHINE SULFATE 4 MG/HR: 50 CAPSULE, EXTENDED RELEASE ORAL at 17:39

## 2018-07-01 RX ADMIN — MORPHINE SULFATE 4 MILLIGRAM(S): 50 CAPSULE, EXTENDED RELEASE ORAL at 17:38

## 2018-07-01 RX ADMIN — ROBINUL 0.2 MILLIGRAM(S): 0.2 INJECTION INTRAMUSCULAR; INTRAVENOUS at 11:51

## 2018-07-01 RX ADMIN — CHLORHEXIDINE GLUCONATE 15 MILLILITER(S): 213 SOLUTION TOPICAL at 05:31

## 2018-07-01 RX ADMIN — ROBINUL 0.2 MILLIGRAM(S): 0.2 INJECTION INTRAMUSCULAR; INTRAVENOUS at 17:38

## 2018-07-01 RX ADMIN — CHLORHEXIDINE GLUCONATE 15 MILLILITER(S): 213 SOLUTION TOPICAL at 17:37

## 2018-07-01 RX ADMIN — MORPHINE SULFATE 4 MILLIGRAM(S): 50 CAPSULE, EXTENDED RELEASE ORAL at 03:15

## 2018-07-01 RX ADMIN — MORPHINE SULFATE 4 MILLIGRAM(S): 50 CAPSULE, EXTENDED RELEASE ORAL at 21:50

## 2018-07-01 RX ADMIN — MORPHINE SULFATE 4 MILLIGRAM(S): 50 CAPSULE, EXTENDED RELEASE ORAL at 21:35

## 2018-07-01 RX ADMIN — MORPHINE SULFATE 4 MILLIGRAM(S): 50 CAPSULE, EXTENDED RELEASE ORAL at 03:00

## 2018-07-01 RX ADMIN — MORPHINE SULFATE 4 MILLIGRAM(S): 50 CAPSULE, EXTENDED RELEASE ORAL at 18:39

## 2018-07-01 RX ADMIN — ROBINUL 0.2 MILLIGRAM(S): 0.2 INJECTION INTRAMUSCULAR; INTRAVENOUS at 05:31

## 2018-07-01 RX ADMIN — MORPHINE SULFATE 4 MG/HR: 50 CAPSULE, EXTENDED RELEASE ORAL at 17:44

## 2018-07-01 RX ADMIN — MORPHINE SULFATE 4 MILLIGRAM(S): 50 CAPSULE, EXTENDED RELEASE ORAL at 20:09

## 2018-07-01 NOTE — PROGRESS NOTE ADULT - ASSESSMENT
78y Female patient Presented S/P unwitnessed fall. Left 10-11 rib frx, C2 avulsion frx, BL mandibular body frx, Small L SAH L thigh hematoma, L wrist frx, DNR/DNI after family meeting yesterday, with the above physical exam, labs, and imaging findings.    Plan:  Palliative: moved to  bed  Family stating their only wish is for patient to be pain free and comfortable at this time  No further medical ventilation  No further blood work or finger sticks  No artifical nutrition or IV hydration  No antibiotics

## 2018-07-01 NOTE — PROGRESS NOTE ADULT - SUBJECTIVE AND OBJECTIVE BOX
GENERAL SURGERY PROGRESS NOTE    Patient: KYM ROTH , 78y ([birth year])Female   MRN: [id number]  Location: [address]  Visit: 06-21-18 Inpatient  Date: 07-01-18 @ 01:55    Hospital Day #: 11     Procedure/Dx/Injuries: Presented S/P unwitnessed fall. Left 10-11 rib frx, C2 avulsion frx, BL mandibular body frx, Small L SAH L thigh hematoma, L wrist frx    Events of past 24 hours: No escalation in care, Comfort measures only, DNR/DNI after family meeting yesterday    PAST MEDICAL & SURGICAL HISTORY:  Dementia without behavioral disturbance, unspecified dementia type  No significant past surgical history    Vitals: T(F): 99.1 (06-30-18 @ 08:00), Max: 100.5 (06-30-18 @ 02:15)  HR: 113 (06-30-18 @ 08:00)  BP: 130/60 (06-30-18 @ 08:00)  RR: 20 (06-30-18 @ 08:00)  SpO2: --    Diet, NPO with Tube Feed:   Tube Feeding Modality: Nasogastric  Osmolite 1.5 Ashu  Total Volume for 24 Hours (mL): 1440  Continuous  Starting Tube Feed Rate mL per Hour: 10  Increase Tube Feed Rate by (mL): 20     Every 4 hours  Until Goal Tube Feed Rate (mL per Hour): 60  Tube Feed Duration (in Hours): 24  Tube Feed Start Time: 18:00    Fluids: IVL    I & O's:    06-29-18 @ 07:01  -  06-30-18 @ 07:00  --------------------------------------------------------  IN:    morphine  Infusion: 80 mL    Solution: 50 mL  Total IN: 130 mL    OUT:    Indwelling Catheter - Urethral: 400 mL  Total OUT: 400 mL    Total NET: -270 mL    Physical Examination:  General: NAD, AAOx1  HEENT: MIAMI J collar in place  Heart: S1 S2, No MRG RRR   Lungs: CTABL +BS Equal BL, No WRC  Abdomen:  +BS. SNDNT. Obese.   MSK/Extremities: ROM intact BL UE/LE, LUE in splint     MEDICATIONS  (STANDING):  acetaminophen   Tablet. 650 milliGRAM(s) Oral every 6 hours  chlorhexidine 0.12% Liquid 15 milliLiter(s) Swish and Spit two times a day  escitalopram 5 milliGRAM(s) Oral daily  gabapentin 100 milliGRAM(s) Oral daily  glycopyrrolate Injectable 0.2 milliGRAM(s) IV Push every 6 hours  levETIRAcetam  IVPB 500 milliGRAM(s) IV Intermittent every 12 hours  memantine 10 milliGRAM(s) Oral daily  morphine  - Injectable 4 milliGRAM(s) IV Push every 4 hours  morphine  Infusion 4 mG/Hr (4 mL/Hr) IV Continuous <Continuous>  oxybutynin 10 milliGRAM(s) Oral daily    MEDICATIONS  (PRN):  LORazepam   Injectable 1 milliGRAM(s) IntraMuscular every 6 hours PRN Agitation  morphine  - Injectable 4 milliGRAM(s) IV Push every 15 minutes PRN respiratory distress  LAB/STUDIES:  CAPILLARY BLOOD GLUCOSE

## 2018-07-02 DIAGNOSIS — Z51.5 ENCOUNTER FOR PALLIATIVE CARE: ICD-10-CM

## 2018-07-02 RX ORDER — ATROPINE SULFATE 1 %
2 DROPS OPHTHALMIC (EYE) EVERY 6 HOURS
Qty: 0 | Refills: 0 | Status: DISCONTINUED | OUTPATIENT
Start: 2018-07-02 | End: 2018-07-02

## 2018-07-02 RX ADMIN — MORPHINE SULFATE 4 MILLIGRAM(S): 50 CAPSULE, EXTENDED RELEASE ORAL at 02:08

## 2018-07-02 RX ADMIN — MORPHINE SULFATE 4 MILLIGRAM(S): 50 CAPSULE, EXTENDED RELEASE ORAL at 05:55

## 2018-07-02 RX ADMIN — Medication 1 MILLIGRAM(S): at 11:32

## 2018-07-02 RX ADMIN — ROBINUL 0.2 MILLIGRAM(S): 0.2 INJECTION INTRAMUSCULAR; INTRAVENOUS at 11:33

## 2018-07-02 RX ADMIN — ROBINUL 0.2 MILLIGRAM(S): 0.2 INJECTION INTRAMUSCULAR; INTRAVENOUS at 06:08

## 2018-07-02 RX ADMIN — CHLORHEXIDINE GLUCONATE 15 MILLILITER(S): 213 SOLUTION TOPICAL at 05:57

## 2018-07-02 RX ADMIN — LEVETIRACETAM 420 MILLIGRAM(S): 250 TABLET, FILM COATED ORAL at 11:29

## 2018-07-02 RX ADMIN — MORPHINE SULFATE 4 MILLIGRAM(S): 50 CAPSULE, EXTENDED RELEASE ORAL at 01:53

## 2018-07-02 NOTE — PROGRESS NOTE ADULT - SUBJECTIVE AND OBJECTIVE BOX
Progress Note: Trauma Surgery  Patient: KYM ROTH , 78y (1940)Female   MRN: [id number]  Location: [address]  Visit: 06-21-18 Inpatient  Date: 07-02-18 @ 02:50  Hospital Day: 12    Procedure/Injury: Presented S/P unwitnessed fall. Left 10-11 rib frx, C2 avulsion frx, BL mandibular body frx, Small L SAH L thigh hematoma, L wrist frx  Events over 24h: No escalation in care, Comfort measures only, DNR/DNI after family meeting, palliative following [phone]    Vitals: T(F): 97.8 (07-01-18 @ 07:35), Max: 97.8 (07-01-18 @ 07:35)  HR: 108 (07-01-18 @ 07:35)  BP: 144/61 (07-01-18 @ 07:35) (144/61 - 144/61)    Diet: Diet, NPO with Tube Feed:   Tube Feeding Modality: Nasogastric  Osmolite 1.5 Ashu  Total Volume for 24 Hours (mL): 1440  Continuous  Starting Tube Feed Rate mL per Hour: 10  Increase Tube Feed Rate by (mL): 20     Every 4 hours  Until Goal Tube Feed Rate (mL per Hour): 60  Tube Feed Duration (in Hours): 24  Tube Feed Start Time: 18:00 (06-26-18 @ 17:53)  IV Fluid: IVL  Bowel function: Bowel movement [  ] Flatus [  ]    In:   07-01-18 @ 07:01  -  07-02-18 @ 02:50  --------------------------------------------------------  IN: 136 mL  Out:   07-01-18 @ 07:01  -  07-02-18 @ 02:50  --------------------------------------------------------  OUT:    Indwelling Catheter - Urethral: 450 mL  Total OUT: 450 mL  Net:   07-01-18 @ 07:01  -  07-02-18 @ 02:50  --------------------------------------------------------  NET: -314 mL    Physical Examination:  General: NAD, AAOx1  HEENT: MIAMI J collar in place  Heart: S1 S2, No MRG RRR   Lungs: CTABL +BS Equal BL, No WRC  Abdomen:  +BS. SNDNT. Obese.   MSK/Extremities: ROM intact BL UE/LE, LUE in splint     Medications: [Standing]  acetaminophen   Tablet. 650 milliGRAM(s) Oral every 6 hours  chlorhexidine 0.12% Liquid 15 milliLiter(s) Swish and Spit two times a day  escitalopram 5 milliGRAM(s) Oral daily  gabapentin 100 milliGRAM(s) Oral daily  glycopyrrolate Injectable 0.2 milliGRAM(s) IV Push every 6 hours  levETIRAcetam  IVPB 500 milliGRAM(s) IV Intermittent every 12 hours  memantine 10 milliGRAM(s) Oral daily  morphine  - Injectable 4 milliGRAM(s) IV Push every 4 hours  morphine  Infusion 4 mG/Hr (4 mL/Hr) IV Continuous <Continuous>  oxybutynin 10 milliGRAM(s) Oral daily    Medications:[PRN]  LORazepam   Injectable 1 milliGRAM(s) IntraMuscular every 6 hours PRN  morphine  - Injectable 4 milliGRAM(s) IV Push every 15 minutes PRN    Labs:  None  Micro/Urine:  None  Imaging:  None/24h

## 2018-07-02 NOTE — PROGRESS NOTE ADULT - PROVIDER SPECIALTY LIST ADULT
Neurosurgery
Palliative Care
SICU
Trauma Surgery

## 2018-07-02 NOTE — DISCHARGE NOTE FOR THE EXPIRED PATIENT - REASON FOR ADMISSION
sepsis
Principal Discharge Dx Subarachnoid bleed. Secondary Discharge Dx Fracture of cervical spine without spinal cord lesion. Secondary Discharge Dx Rib fracture

## 2018-07-02 NOTE — PROGRESS NOTE ADULT - ASSESSMENT
Assessment:  78y Female patient Presented S/P unwitnessed fall. Left 10-11 rib frx, C2 avulsion frx, BL mandibular body frx, Small L SAH L thigh hematoma, L wrist frx  Events over 24h: No escalation in care, Comfort measures only, DNR/DNI after family meeting, palliative following [phone], with the above physical exam, labs, and imaging findings.    Plan:  Palliative: moved to  bed  Family stating their only wish is for patient to be pain free and comfortable at this time  No further medical ventilation  No further blood work or finger sticks  No artifical nutrition or IV hydration  No antibiotics  Morphine gtt @4mg/hr  Cont ativan 1mg IV q3h PRN agitation  Lauren ATC  Will need to discuss hospice/dispo Monday    Date/Time: 07-02-18 @ 02:50

## 2018-07-02 NOTE — PROGRESS NOTE ADULT - SUBJECTIVE AND OBJECTIVE BOX
78yFemale with diagnosis: SUBARACHNOID BLEED; FRACTURE OF CERVICAL SPINE WITHOUT SPINAL CORD LEASION      Patient seen, lying in bed, actively dying      PHYSICAL EXAM  Skin warm and dry  RR 16-18, agonal, periods of apnea, secretions - death rattle  's weak distal pulse  generalized edema    NO VS    T(C): --  T(F): --  HR: --  BP: --  RR: --  SpO2: --      NO labs  LABS:                                                                                                                               MEDICATIONS  (STANDING):  atropine 1% Ophthalmic Solution for SubLingual Use 2 Drop(s) SubLingual every 6 hours  chlorhexidine 0.12% Liquid 15 milliLiter(s) Swish and Spit two times a day  glycopyrrolate Injectable 0.2 milliGRAM(s) IV Push every 6 hours  levETIRAcetam  IVPB 500 milliGRAM(s) IV Intermittent every 12 hours  morphine  Infusion 4 mG/Hr (4 mL/Hr) IV Continuous <Continuous>    MEDICATIONS  (PRN):  LORazepam   Injectable 1 milliGRAM(s) IV Push every 6 hours PRN Agitation/restlessness 1dose/24H  morphine  - Injectable 4 milliGRAM(s) IV Push every 15 minutes PRN respiratory distress 2doses/24H

## 2018-07-02 NOTE — DISCHARGE NOTE FOR THE EXPIRED PATIENT - HOSPITAL COURSE
pt admitted for sepsis and shock, ICU care was deferred for poor prognosis. Pt's  and patient agreed to AI transfer, DNR/DNI and goal comfort. Treatment for sepsis and vasopressors were in progress and TPN as per wishes of the family.
79y/o F with PMHx of Dementia stage 4, presented in ER for evaluation of possible fall, unknown onset and unknown LOC. The patient lived at home with daughter and home aide. During hospitalization she was evaluated and treated for subarachnoid hemorrhage mandible fx; C2 fracture; l left distal radius fracture s/p reduced with splint , left base of 5th metacarpal fracture; lt 10th 11th ribs fractured; patient experienced respiratory distress and after a family meeting a trial of mechanical respirations was done to which patient did not demonstrate positive response and declined; bronchial cultures positive for staph aureus; after another family meeting patient was liberated from mechanical ventilation on 6/28 and comfort measures only was initiated with the prognosis that death would occur in hospital due to the need for IV medication for symptom management.    Today, RN called to report no pulse/respirations/responsiveness assessment revealed the same at 1320

## 2018-07-02 NOTE — PROGRESS NOTE ADULT - PROBLEM SELECTOR PLAN 3
For secretions - continue robinal 0.2mg every 6 H and add atropine opthalmic solution 2gtts SL every 6h

## 2018-07-02 NOTE — CHART NOTE - NSCHARTNOTEFT_GEN_A_CORE
Registered Dietitian Limited Follow-Up     Pt s/p liberation and transfer from ICU to  bed in vent unit on 6/29. Per Palliative, comfort measures only, getting Morphine IV RTC and rubinol. Pt remains comfortable and minimally responsive. Family stating their only wish is for patient to be pain free and comfortable at this time. No further medical ventilation. No further blood work or finger sticks. No artifical nutrition or IV hydration. No antibiotics. No further nutritional interventions at this time; RD to sign off.

## 2018-07-02 NOTE — PROGRESS NOTE ADULT - ASSESSMENT
78 yr old with advanced dementia s/p unwitnessed fall at home with multiple fractures. s/p trial of intubation without improvement in condition and subsequently liberated in ICU and then d/g to AI unit.    Continues to be comfort measures only and is in the dying process.

## 2018-07-12 DIAGNOSIS — C95.91 LEUKEMIA, UNSPECIFIED, IN REMISSION: ICD-10-CM

## 2018-07-12 DIAGNOSIS — S02.602A FRACTURE OF UNSPECIFIED PART OF BODY OF LEFT MANDIBLE, INITIAL ENCOUNTER FOR CLOSED FRACTURE: ICD-10-CM

## 2018-07-12 DIAGNOSIS — S02.601A FRACTURE OF UNSPECIFIED PART OF BODY OF RIGHT MANDIBLE, INITIAL ENCOUNTER FOR CLOSED FRACTURE: ICD-10-CM

## 2018-07-12 DIAGNOSIS — J18.9 PNEUMONIA, UNSPECIFIED ORGANISM: ICD-10-CM

## 2018-07-12 DIAGNOSIS — E83.42 HYPOMAGNESEMIA: ICD-10-CM

## 2018-07-12 DIAGNOSIS — N17.9 ACUTE KIDNEY FAILURE, UNSPECIFIED: ICD-10-CM

## 2018-07-12 DIAGNOSIS — S06.6X0A TRAUMATIC SUBARACHNOID HEMORRHAGE WITHOUT LOSS OF CONSCIOUSNESS, INITIAL ENCOUNTER: ICD-10-CM
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
## 2018-07-12 DIAGNOSIS — E27.9 DISORDER OF ADRENAL GLAND, UNSPECIFIED: ICD-10-CM

## 2018-07-12 DIAGNOSIS — I95.9 HYPOTENSION, UNSPECIFIED: ICD-10-CM

## 2018-07-12 DIAGNOSIS — S70.12XA CONTUSION OF LEFT THIGH, INITIAL ENCOUNTER: ICD-10-CM

## 2018-07-12 DIAGNOSIS — R78.81 BACTEREMIA: ICD-10-CM

## 2018-07-12 DIAGNOSIS — S22.42XA MULTIPLE FRACTURES OF RIBS, LEFT SIDE, INITIAL ENCOUNTER FOR CLOSED FRACTURE: ICD-10-CM

## 2018-07-12 DIAGNOSIS — M84.442A: ICD-10-CM

## 2018-07-12 DIAGNOSIS — M47.9 SPONDYLOSIS, UNSPECIFIED: ICD-10-CM

## 2018-07-12 DIAGNOSIS — G93.41 METABOLIC ENCEPHALOPATHY: ICD-10-CM

## 2018-07-12 DIAGNOSIS — J96.90 RESPIRATORY FAILURE, UNSPECIFIED, UNSPECIFIED WHETHER WITH HYPOXIA OR HYPERCAPNIA: ICD-10-CM

## 2018-07-12 DIAGNOSIS — Z85.528 PERSONAL HISTORY OF OTHER MALIGNANT NEOPLASM OF KIDNEY: ICD-10-CM

## 2018-07-12 DIAGNOSIS — N39.0 URINARY TRACT INFECTION, SITE NOT SPECIFIED: ICD-10-CM

## 2018-07-12 DIAGNOSIS — S12.190A OTHER DISPLACED FRACTURE OF SECOND CERVICAL VERTEBRA, INITIAL ENCOUNTER FOR CLOSED FRACTURE: ICD-10-CM

## 2018-07-12 DIAGNOSIS — S52.572A OTHER INTRAARTICULAR FRACTURE OF LOWER END OF LEFT RADIUS, INITIAL ENCOUNTER FOR CLOSED FRACTURE: ICD-10-CM

## 2018-07-12 DIAGNOSIS — D64.9 ANEMIA, UNSPECIFIED: ICD-10-CM

## 2018-07-12 DIAGNOSIS — I10 ESSENTIAL (PRIMARY) HYPERTENSION: ICD-10-CM

## 2018-07-12 DIAGNOSIS — F03.90 UNSPECIFIED DEMENTIA WITHOUT BEHAVIORAL DISTURBANCE: ICD-10-CM

## 2018-07-12 DIAGNOSIS — Z51.5 ENCOUNTER FOR PALLIATIVE CARE: ICD-10-CM
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
## 2018-07-12 DIAGNOSIS — J90 PLEURAL EFFUSION, NOT ELSEWHERE CLASSIFIED: ICD-10-CM

## 2018-07-12 DIAGNOSIS — Z66 DO NOT RESUSCITATE: ICD-10-CM

## 2019-05-08 NOTE — H&P ADULT - NSHPLABSRESULTS_GEN_ALL_CORE
LABS:                        7.7    11.85 )-----------( 135      ( 21 Jun 2018 09:26 )             22.8     21 Jun 2018 06:23    137    |  99     |  22     ----------------------------<  136    4.2     |  21     |  1.6      Ca    8.8        21 Jun 2018 06:23    TPro  6.0    /  Alb  4.0    /  TBili  0.5    /  DBili  x      /  AST  57     /  ALT  32     /  AlkPhos  67     21 Jun 2018 06:23    PT/INR - ( 21 Jun 2018 06:23 )   PT: 12.30 sec;   INR: 1.14 ratio         PTT - ( 21 Jun 2018 06:23 )  PTT:28.1 sec      < from: CT Head No Cont (06.21.18 @ 08:36) >    1.  Small scattered left-sided subarachnoid hemorrhage. No significant   mass effect.    2.  Stable moderate chronic microvascular changes and cerebral atrophy.    < end of copied text >    < from: CT Cervical Spine No Cont (06.21.18 @ 08:36) >    1.  Minimally displaced avulsion fracture of the anterior-inferior C2   vertebral body with mild widening of the anterior C2-C3 disc space,   consistent with extension teardrop fracture. Consider MRI cervical spine   for evaluation for spinal cord injury.    2.  Associated prevertebral edema/hematoma greater on the left side.    3.  Multilevel multilevel severe degenerative changes notable for C3-4   severe spinal stenosis due to a central disc herniation (stable to   slightly increased since the prior exam), and multilevel moderate-severe   neural foraminal stenosis. Diffuse osteopenia.    < end of copied text >    < from: CT Chest w/ IV Cont (06.21.18 @ 08:36) >    IMPRESSION:   1. Acute fractures of the left posterior 10th and 11th ribs; no   pneumothorax.  2. New, indeterminate 2.2 cm left suprarenal mass or lymph node. Consider   adrenal protocol MRI abdomen with and without IV contrast, following   discharge from hospital.    < end of copied text >    < from: CT Maxillofacial No Cont (06.21.18 @ 08:35) >      IMPRESSION:    1.  Acute nondisplaced fractures of the mandibular bodies bilaterally.    2.  Extensive overlying soft tissue swelling and hematoma which extends   deep to the inner margin of the mandible, sublingual space, oropharynx   and tongue base; as well as the  space.    3.  Enlargement and heterogeneity of the left submandibular gland may   reflect a hematoma, recommend clinical follow-up with follow-up imaging   as appropriate.    4.  C2 fracture with extensive prevertebral edema/hematoma, see   accompanying cervical spine CT.    < end of copied text >
English

## 2019-08-15 NOTE — GOALS OF CARE CONVERSATION - PERSONAL ADVANCE DIRECTIVE - CONVERSATION DETAILS
August 15, 2019     Patient: Salima Prince   YOB: 1960   Date of Visit: 8/15/2019       To Whom it May Concern:    Salima Prince was seen in my clinic on 8/15/2019 at 9:00 am.    Please excuse Salima Wilburn for her absence from work on the date listed above to be able to make her appointment.    Sincerely,         Rachel Cheatham MD    Medical information is confidential and cannot be disclosed without the written consent of the patient or her representative.      
Patient is a  79 y/o female with PMHx of Dementia stage 4, presented for evaluation of possible fall, unknown onset and unknown LOC. Patient's family relating they are unsure how patient fell out of bed.      Currently met with family to discuss patient's overall medical conditions and possible treatment options. Family verbalized understanding.  Family is requesting patient be intubated and initiation of artifical nutrition.  Family also opting for DNR.      Plan to monitor patient's overall medical conditions for the next 48 hours and then revisit with family regarding goals of care.
Patient is a 77 y/o female diagnosed with SUBARACHNOID BLEED; FRACTURE OF CERVICAL SPINE WITHOUT SPINAL CORD LEASION. Patient closely monitor by ICU staff, despite escalation in medical management patient declining.     Palliative care team and ICU attending Dr. Myles met with patients family on unit to discuss goals of care.  Patient's daughter Mary and son Guru were present and sister Allison was on the speaker phone.    Discussed patient current diagnosis and grave prognosis. Family verbalized understanding of situation and wish to keep patient comfortable at this time.  Family requesting elective palliative extubation and comfort measures.  Family aware of patients grave condition and palliative extubation will potentially result in patient's eventual passing.  Family stating their only wish is for patient to be pain free and comfortable at this time.      No further medical ventilation. No further blood work or finger sticks. No artifical nutrition or IV hydration.